# Patient Record
Sex: FEMALE | Race: ASIAN | Employment: PART TIME | URBAN - METROPOLITAN AREA
[De-identification: names, ages, dates, MRNs, and addresses within clinical notes are randomized per-mention and may not be internally consistent; named-entity substitution may affect disease eponyms.]

---

## 2017-06-28 ENCOUNTER — HOSPITAL ENCOUNTER (OUTPATIENT)
Facility: CLINIC | Age: 67
Setting detail: SPECIMEN
Discharge: HOME | End: 2017-06-28
Admitting: INTERNAL MEDICINE
Payer: MEDICARE

## 2017-06-28 ENCOUNTER — TELEPHONE (OUTPATIENT)
Dept: GASTROENTEROLOGY | Facility: CLINIC | Age: 67
End: 2017-06-28

## 2017-06-28 ENCOUNTER — CARE COORDINATION (OUTPATIENT)
Dept: CARDIOLOGY | Facility: CLINIC | Age: 67
End: 2017-06-28

## 2017-06-28 ENCOUNTER — OFFICE VISIT (OUTPATIENT)
Dept: FAMILY MEDICINE | Facility: CLINIC | Age: 67
End: 2017-06-28

## 2017-06-28 VITALS
DIASTOLIC BLOOD PRESSURE: 83 MMHG | BODY MASS INDEX: 23.95 KG/M2 | HEART RATE: 71 BPM | WEIGHT: 132 LBS | SYSTOLIC BLOOD PRESSURE: 135 MMHG

## 2017-06-28 DIAGNOSIS — E78.5 HYPERLIPIDEMIA LDL GOAL <100: Primary | ICD-10-CM

## 2017-06-28 DIAGNOSIS — M85.80 OSTEOPENIA, UNSPECIFIED LOCATION: ICD-10-CM

## 2017-06-28 DIAGNOSIS — R21 RASH AND NONSPECIFIC SKIN ERUPTION: ICD-10-CM

## 2017-06-28 DIAGNOSIS — E78.5 HYPERLIPIDEMIA LDL GOAL <100: ICD-10-CM

## 2017-06-28 DIAGNOSIS — D36.9 TUBULAR ADENOMA: Primary | ICD-10-CM

## 2017-06-28 DIAGNOSIS — Z80.0 FAMILY HISTORY OF COLON CANCER: ICD-10-CM

## 2017-06-28 DIAGNOSIS — R06.09 DOE (DYSPNEA ON EXERTION): ICD-10-CM

## 2017-06-28 DIAGNOSIS — N63.20 LEFT BREAST LUMP: ICD-10-CM

## 2017-06-28 DIAGNOSIS — Z83.49 FAMILY HISTORY OF THYROID PROBLEM: ICD-10-CM

## 2017-06-28 DIAGNOSIS — I25.10 CAD (CORONARY ARTERY DISEASE): ICD-10-CM

## 2017-06-28 LAB
ALBUMIN SERPL-MCNC: 4.1 G/DL (ref 3.4–5)
ALP SERPL-CCNC: 82 U/L (ref 40–150)
ALT SERPL W P-5'-P-CCNC: 27 U/L (ref 0–50)
ANION GAP SERPL CALCULATED.3IONS-SCNC: 7 MMOL/L (ref 3–14)
AST SERPL W P-5'-P-CCNC: 20 U/L (ref 0–45)
BILIRUB SERPL-MCNC: 0.8 MG/DL (ref 0.2–1.3)
BUN SERPL-MCNC: 16 MG/DL (ref 7–30)
CALCIUM SERPL-MCNC: 9.5 MG/DL (ref 8.5–10.1)
CHLORIDE SERPL-SCNC: 105 MMOL/L (ref 94–109)
CHOLEST SERPL-MCNC: 204 MG/DL
CK SERPL-CCNC: 304 U/L (ref 30–225)
CO2 SERPL-SCNC: 28 MMOL/L (ref 20–32)
CREAT SERPL-MCNC: 0.65 MG/DL (ref 0.52–1.04)
ERYTHROCYTE [DISTWIDTH] IN BLOOD BY AUTOMATED COUNT: 12.7 % (ref 10–15)
GFR SERPL CREATININE-BSD FRML MDRD: NORMAL ML/MIN/1.7M2
GLUCOSE SERPL-MCNC: 89 MG/DL (ref 70–99)
HCT VFR BLD AUTO: 44.2 % (ref 35–47)
HDLC SERPL-MCNC: 66 MG/DL
HGB BLD-MCNC: 13.9 G/DL (ref 11.7–15.7)
LDLC SERPL CALC-MCNC: 121 MG/DL
MCH RBC QN AUTO: 29.5 PG (ref 26.5–33)
MCHC RBC AUTO-ENTMCNC: 31.4 G/DL (ref 31.5–36.5)
MCV RBC AUTO: 94 FL (ref 78–100)
NONHDLC SERPL-MCNC: 139 MG/DL
PLATELET # BLD AUTO: 205 10E9/L (ref 150–450)
POTASSIUM SERPL-SCNC: 4.3 MMOL/L (ref 3.4–5.3)
PROT SERPL-MCNC: 8.2 G/DL (ref 6.8–8.8)
RBC # BLD AUTO: 4.71 10E12/L (ref 3.8–5.2)
SODIUM SERPL-SCNC: 141 MMOL/L (ref 133–144)
TRIGL SERPL-MCNC: 88 MG/DL
TSH SERPL DL<=0.005 MIU/L-ACNC: 1.24 MU/L (ref 0.4–4)
WBC # BLD AUTO: 3.9 10E9/L (ref 4–11)

## 2017-06-28 PROCEDURE — 80053 COMPREHEN METABOLIC PANEL: CPT | Performed by: INTERNAL MEDICINE

## 2017-06-28 PROCEDURE — 36415 COLL VENOUS BLD VENIPUNCTURE: CPT | Performed by: INTERNAL MEDICINE

## 2017-06-28 PROCEDURE — 85027 COMPLETE CBC AUTOMATED: CPT | Performed by: INTERNAL MEDICINE

## 2017-06-28 PROCEDURE — 82550 ASSAY OF CK (CPK): CPT | Performed by: INTERNAL MEDICINE

## 2017-06-28 PROCEDURE — 80061 LIPID PANEL: CPT | Performed by: INTERNAL MEDICINE

## 2017-06-28 ASSESSMENT — PAIN SCALES - GENERAL: PAINLEVEL: MILD PAIN (3)

## 2017-06-28 NOTE — PATIENT INSTRUCTIONS
Primary Care Center Medication Refill Request Information:  * Please contact your pharmacy regarding ANY request for medication refills.  ** [facility] Prescription Fax = [phone]  * Please allow 3 business days for routine medication refills.  * Please allow 5 business days for controlled substance medication refills.     Primary Care Center Test Result notification information:  *You will be notified within 7-10 days of your appointment day regarding the results of your test.  If you are on MyChart you will be notified as soon as the provider has reviewed the results and signed off on them.    Mammogram Screening Tool    Mammogram   Does patient have a history of breast cancer? no  Does patient have breast implants? no  Reason for mammogram? Routine

## 2017-06-28 NOTE — NURSING NOTE
Chief Complaint   Patient presents with     Recheck Medication     Pt is here to follow up on her medications.      Isabella Huston LPN June 28, 2017 7:47 AM

## 2017-06-28 NOTE — MR AVS SNAPSHOT
After Visit Summary   6/28/2017    Anibal Storey    MRN: [id number]           Patient Information     Date Of Birth          1950        Visit Information        Provider Department      6/28/2017 8:35 AM Roque Almaguer MD [facility]        Today's Diagnoses     Tubular adenoma    -  1    Family history of colon cancer        Left breast lump        SAUCEDO (dyspnea on exertion)        Rash and nonspecific skin eruption        Osteopenia, unspecified location        Family history of thyroid problem          Care Instructions    Primary Care Center Medication Refill Request Information:  * Please contact your pharmacy regarding ANY request for medication refills.  ** [facility] Prescription Fax = [phone]  * Please allow 3 business days for routine medication refills.  * Please allow 5 business days for controlled substance medication refills.     Primary Care Center Test Result notification information:  *You will be notified within 7-10 days of your appointment day regarding the results of your test.  If you are on MyChart you will be notified as soon as the provider has reviewed the results and signed off on them.    Mammogram Screening Tool    Mammogram   Does patient have a history of breast cancer? no  Does patient have breast implants? no  Reason for mammogram? Routine              Follow-ups after your visit        Additional Services     ALLERGY/ASTHMA ADULT REFERRAL       Your provider has referred you to: [facility]: Allergy and Asthma Center [facility] [phone]   [email or web address]    Please be aware that coverage of these services is subject to the terms and limitations of your health insurance plan.  Call member services at your health plan with any benefit or coverage questions.      Please bring the following with you to your appointment:    (1) Any X-Rays, CTs or MRIs which have been performed.  Contact the facility where they were done to arrange  for  prior to your scheduled appointment.    (2) List of current medications  (3) This referral request   (4) Any documents/labs given to you for this referral            CANCER RISK MGMT/CANCER GENETIC COUNSELING REFERRAL       Your provider has referred you to the Cancer Risk Management Program - Cancer Genetic Counseling.    Reason for Referral: FH colon ca    We have a sent a notice to a staff member of the Cancer Risk Management Program to give you a call to assist with scheduling your appointment.  You may also call  [phone] ([phone]) to initiate scheduling.    Please be aware that coverage of these services is subject to the terms and limitations of your health insurance plan.  Call member services at your health plan with any benefit or coverage questions.      Please bring the completed family history sheet to your appointment in addition to any available outside medical records documenting your cancer diagnosis.            ENDOCRINOLOGY ADULT REFERRAL       Your provider has referred you to: [facility]: Endocrinology and Diabetes Clinic [facility] [phone]   [email or web address]      Please be aware that coverage of these services is subject to the terms and limitations of your health insurance plan.  Call member services at your health plan with any benefit or coverage questions.      Please bring the following to your appointment:    >>   Any x-rays, CTs or MRIs which have been performed.  Contact the facility where they were done to arrange for  prior to your scheduled appointment.    >>   List of current medications   >>   This referral request   >>   Any documents/labs given to you for this referral            GI Procedure Referral       Last Lab Result: Creatinine (mg/dL)       Date                     Value                 09/23/2016               0.67             ----------  Body mass index is 23.95  kg/(m^2).     Needed:  No  Language:  English    Patient will be contacted to schedule procedure.     Please be aware that coverage of these services is subject to the terms and limitations of your health insurance plan.  Call member services at your health plan with any benefit or coverage questions.  Any procedures must be performed at a [place] facility OR coordinated by your clinic's referral office.    Please bring the following with you to your appointment:    (1) Any X-Rays, CTs or MRIs which have been performed.  Contact the facility where they were done to arrange for  prior to your scheduled appointment.    (2) List of current medications   (3) This referral request   (4) Any documents/labs given to you for this referral                  Follow-up notes from your care team     Return in about 1 year (around 6/28/2018).      Your next 10 appointments already scheduled     Jun 28, 2017 10:00 AM CDT   LAB with  LAB   [facility] ([facility])    [address] [place] [address] [phone]           Patient must bring picture ID.  Patient should be prepared to give a urine specimen  Please do not eat 10-12 hours before your appointment if you are coming in fasting for labs on lipids, cholesterol, or glucose (sugar).  Pregnant women should follow their Care Team instructions. Water with medications is okay. Do not drink coffee or other fluids.   If you have concerns about taking  your medications, please ask at office or if scheduling via MtoVhart, send a message by clicking on Secure Messaging, Message Your Care Team.            Jun 29, 2017  8:30 AM CDT   Lab with  LAB    [facility] ([facility])    [address] [place] [address] [phone]            Jun 29, 2017  9:30 AM CDT   (Arrive by 9:15 AM)   Return Visit with Frank [place] MD   [facility] ([facility] [place])    [address] [place] [address]   [phone]            Jul 07, 2017  2:20 PM CDT   (Arrive by 2:05 PM)   RETURN ENDOCRINE with Bridget Cheng MD   [facility] ([facility])    [address] [place] [address]   [phone]            Jul 12, 2017  1:00 PM CDT   (Arrive by 12:45 PM)   MA DIAGNOSTIC BILATERAL W/ MERLINE with [email or web address]   [facility] ([facility] [place])    [address] [place] [address]   [phone]           Do not use any powder, lotion or deodorant under your arms or on your breast. If you do, we will ask you to remove it before your exam.  Wear comfortable, two-piece clothing.  If you have any allergies, tell your care team.  Bring any previous mammograms from other facilities or have them mailed to the breast center.            Jul 12, 2017  1:30 PM CDT   US BREAST LEFT LIMITED 1-3 QUAD with [email or web address]   [facility] ([facility])    [address] [place] [address] [phone]           Please bring a list of your medicines (including vitamins, minerals and over-the-counter drugs). Also, tell your doctor about any allergies you may have. Wear comfortable clothes and leave your valuables at home.  You do not need to do anything special to prepare for your exam.  Please call the Imaging Department at your exam site with any questions.            Aug 10, 2017  9:15 AM CDT   RETURN GENERAL with Morgan Munguia MD   [facility] ([facility])    [address]  [address]  [facility] [address]   [phone]              Future tests that were ordered for you today     Open Future Orders        Priority Expected Expires Ordered    US Breast Left Limited 1-3 Quadrants Routine  6/28/2018 6/28/2017    [place]  Diagnostic Bilateral w/Jose Routine  6/28/2018 6/28/2017    US Breast Left Complete 4 Quadrants Routine  6/28/2018 6/28/2017    TSH with free T4 reflex Routine 6/28/2017 7/12/2017 6/28/2017            Who to contact     Please call your clinic at [phone] to:    Ask questions about your health    Make or cancel appointments    Discuss your medicines    Learn about your test results    Speak to your doctor   If you have compliments or concerns about an experience at your clinic, or if you wish to file a complaint, please contact [facility] Patient Relations at [phone] or email us at [email or web address]         Additional Information About Your Visit        Debt Resolve Information     Debt Resolve gives you secure access to your electronic health record. If you see a primary care provider, you can also send messages to your care team and make appointments. If you have questions, please call your primary care clinic.  If you do not have a primary care provider, please call [phone] and they will assist you.      [employer]olve is an electronic gateway that provides easy, online access to your medical records. With [employer], you can request a clinic appointment, read your test results, renew a prescription or communicate with your care team.     To access your existing account, please contact your [facility] or call [phone] for assistance.        Care EveryWhere ID     This is your Care EveryWhere ID. This could be used by other organizations to access your [facility] medical records  [id number]        Your Vitals Were     Pulse BMI (Body Mass Index)                71 23.95 kg/m2           Blood Pressure from Last 3 Encounters:   06/28/17 135/83   09/23/16 148/90   06/17/16 138/85    Weight from Last 3 Encounters:   06/28/17 59.9 kg (132 lb)   09/23/16 60.8 kg (134 lb)   06/17/16 62.7 kg (138 lb 3.2 oz)              We Performed the Following      ALLERGY/ASTHMA ADULT REFERRAL     CANCER RISK MGMT/CANCER GENETIC COUNSELING REFERRAL     ENDOCRINOLOGY ADULT REFERRAL     GI Procedure Referral        Primary Care Provider Office Phone # Fax #    Roque Almaguer [phone]       PRIMARY CARE CENTER [address] [place] [id number]        Equal Access to Services     COURTNEY FRANCIS : Hadii aad ku hadasho Soomaali, waaxda luqadaha, qaybta kaalmada adeegyada, waxay idiin hayaan ademanuel khleeleemarimar lasachin macdonald. So [facility] [phone].    ATENCIÓN: Si habla español, tiene a mata disposición servicios gratuitos de asistencia lingüística. Airam al [phone].    We comply with applicable federal civil rights laws and [place] laws. We do not discriminate on the basis of race, color, national origin, age, disability sex, sexual orientation or gender identity.            Thank you!     Thank you for choosing [facility]  for your care. Our goal is always to provide you with excellent care. Hearing back from our patients is one way we can continue to improve our services. Please take a few minutes to complete the written survey that you may receive in the mail after your visit with us. Thank you!             Your Updated Medication List - Protect others around you: Learn how to safely use, store and throw away your medicines at www.disposemymeds.org.          This list is accurate as of: 6/28/17  9:48 AM.  Always use your most recent med list.                   Brand Name Dispense Instructions for use Diagnosis    ALEVE PO      Take 220 mg by mouth daily as needed for moderate pain        aspirin 81 MG EC tablet     90 tablet    Take 1 tablet (81 mg) by mouth daily    Chest pain at rest       calcium carbonate 500 MG chewable tablet    TUMS     Take 1 chew tab by mouth as needed.        clobetasol 0.05 % ointment    TEMOVATE    15 g    Use on itchy areas on hands and feet for up to 2 weeks    Dyshidrotic eczema       metroNIDAZOLE 0.75  % cream    METROCREAM    45 g    Apply topically 2 times daily To affected areas on the face    Rosacea       NATURAL BALANCE OP      Apply to eye daily Preservative free both eyes.        nitroGLYcerin 0.4 MG sublingual tablet    NITROSTAT    30 tablet    Place 1 tablet (0.4 mg) under the tongue every 5 minutes as needed for chest pain    Chest pain at rest       rosuvastatin 5 MG tablet    CRESTOR    90 tablet    Take 1 tablet (5 mg) by mouth daily    Hyperlipidemia LDL goal <100       TYLENOL PO      Take 500 mg by mouth 2 times daily as needed.        VITAMIN C PO      Take 500 mg by mouth as needed. Pt states she takes this when she gets a cold        VITAMIN D (CHOLECALCIFEROL) PO      Take 1,000 Units by mouth daily Every other day

## 2017-06-28 NOTE — PROGRESS NOTES
SUBJECTIVE:    Pt is a 66 year old female with pmh of     Patient Active Problem List   Diagnosis     CAD (coronary artery disease)     RA (rheumatoid arthritis) (H)     Hyperlipidemia LDL goal <100     Osteopenia     Palpitations     Post-menopausal       who is here for evaluation of had concerns including Recheck Medication.    Here for a f/u. Spent long winter in [facility].  1--possible alleriges or asthma (no h/o this) --rashes, seem to come on more in response to certain triggers, she wonders about mites. Also notes in certain environments, has SAUCEDO. No h/o asthma, but does have a recent normal cardiac consult and stress test elsewhere (in [place]) she states, and she was told by MD elsewhere that colder weather is a trigger, and she finds that to be true.   2--CAD: no angina. Sees heart MD after me today. Can tolerate name brand Crestor, not generic.   3--strong FH hypothyroid, she has no classic thyroid sx on ROS  4--FH cancer: dad/sister colon, sister breast, sister uterine, sister bladder. Due for routine colonoscopy for FH colon ca plus her adenoma, also we discuss and she'll go to genetics.  5--due for mammogram, she can still feel a lump that two years ago resulted in u/s and diagnositc mammogram, c/w benign, she'd like to redo, I told her from finding two years ago she'll likely always feel that.  6-occasional hypoglycemic attacks, controls w/ whole grain not refined carbos, and protein  7--I wrote down for her in October to get flu shot and Pvx 23.  8--osteopenia: discussed could do dexa later this summer. She's very hesitant to take meds. She will go to endo to discuss long term bone health mgmt  9--RA, provider is [facility] (per pt they don't offer allergy services)  10--varicose veins gradually worse both legs, some dependent edema chronic and mild.     Allergies   Allergen Reactions     Diltiazem      Serious heart attack symptoms     Ranexa [Ranolazine]      Serious heart attack symptoms       Atenolol Difficulty breathing     Atorvastatin Swelling     Joint swelling     Bacitracin      Red eye     Colchicine Swelling     Cortizone For Kids [Hydrocortisone] Fatigue     Headache also     Lisinopril Swelling and Difficulty breathing     Biaxin [Clarithromycin-Fd&C Yellow #10] Rash     Polymyxin B Rash     Trimox Other (See Comments) and Rash           Current Outpatient Prescriptions   Medication Sig Dispense Refill     Artificial Tear Solution (NATURAL BALANCE OP) Apply to eye daily Preservative free both eyes.       clobetasol (TEMOVATE) 0.05 % ointment Use on itchy areas on hands and feet for up to 2 weeks 15 g 1     metroNIDAZOLE (METROCREAM) 0.75 % cream Apply topically 2 times daily To affected areas on the face 45 g 3     rosuvastatin (CRESTOR) 5 MG tablet Take 1 tablet (5 mg) by mouth daily 90 tablet 3     VITAMIN D, CHOLECALCIFEROL, PO Take 1,000 Units by mouth daily Every other day       nitroglycerin (NITROSTAT) 0.4 MG SL tablet Place 1 tablet (0.4 mg) under the tongue every 5 minutes as needed for chest pain 30 tablet 11     aspirin 81 MG EC tablet Take 1 tablet (81 mg) by mouth daily 90 tablet 3     Naproxen Sodium (ALEVE PO) Take 220 mg by mouth daily as needed for moderate pain       Acetaminophen (TYLENOL PO) Take 500 mg by mouth 2 times daily as needed.        Ascorbic Acid (VITAMIN C PO) Take 500 mg by mouth as needed. Pt states she takes this when she gets a cold        calcium carbonate (TUMS) 500 MG chewable tablet Take 1 chew tab by mouth as needed.       Allergies   Allergen Reactions     Diltiazem      Serious heart attack symptoms     Ranexa [Ranolazine]      Serious heart attack symptoms      Atenolol Difficulty breathing     Atorvastatin Swelling     Joint swelling     Bacitracin      Red eye     Colchicine Swelling     Cortizone For Kids [Hydrocortisone] Fatigue     Headache also     Lisinopril Swelling and Difficulty breathing     Biaxin [Clarithromycin-Fd&C Yellow #10] Rash      Polymyxin B Rash     Trimox Other (See Comments) and Rash     Past Medical History:   Diagnosis Date     Arthritis     Palindromic Rheumatism     Breast tumor      CAD (coronary artery disease)     stenting of the LAD and LCx on 11/30/2012      Cardiac arrhythmia      Cataract, bilateral      Dry eyes     severe     IBS (irritable bowel syndrome)      Optic disc anomalies     tilted     Ptosis      RA (rheumatoid arthritis) (H)      Syncope and collapse      Past Surgical History:   Procedure Laterality Date     APPENDECTOMY  1966     BLEPHAROPLASTY Bilateral 4/2016    doen in [place]     cardiac stents  2012     CHOLECYSTECTOMY  1991     COLONOSCOPY  6/2/2014    Procedure: COMBINED COLONOSCOPY, SINGLE BIOPSY/POLYPECTOMY BY BIOPSY;  Surgeon: Duncan Yanez MD;  Location:  GI     EXCISE BREAST CYST/FIBROADENOMA/TUMOR/DUCT LESION/NIPPLE LESION/AREOLAR LESION  1984 or '85     EYE SURGERY  02/27/2008    bilateral upper bleph repair     PUNCTAL CLOSURE, PLUGS  about 2007    Dr. Santos / javy SAEED         Social History   Substance Use Topics     Smoking status: Never Smoker     Smokeless tobacco: Never Used     Alcohol use Yes      Comment: social             OBJECTIVE:  /83  Pulse 71  Wt 59.9 kg (132 lb)  BMI 23.95 kg/m2  GENERAL APPEARANCE: Alert, no acute distress  HENT: Ears and TMs normal, oral mucosa and posterior oropharynx normal  NECK: No adenopathy,masses or thyromegaly  RESP: lungs clear to auscultation   CV: normal rate, regular rhythm, no murmur or gallop  ABDOMEN: soft, no organomegaly, masses or tenderness  MS: extremities normal, no peripheral edema  SKIN: scattered red flat patches, legs mild varicose veins  NEURO: Alert, oriented, speech and mentation normal  PSYCHE: mentation appears normal, affect and mood normal    ASSESSMENT/PLAN:    Rash: offered referral back to derm, she's been there, for now she'd like to see if allergy identifiable  SAUCEDO: see above, worse w/ certain  weather, and per pt neg cardiology eval in [place] a few mo ago    Will ask allergist to see her for both, possible asthma, and to her the rash seems worse in certain environments as well    CAD: per cardiology, sees today w/ labs    HCM: colonoscopy, mammogram both this summer. Offered Dexa, she'd like to see endocrine to discuss both this and FH thyroid dz (hypothyroid, one w/ cancer).    Genetics clinic for family cancer    Mammogram w/ u/s for persisitent self identified lump which worries her despite benign characertistics on 2015 eval    Varicosities, she could wear TEDS she knows    This fall pvx 23 she knows, has in writing    RA: at this point she just sees immunologist if flares, manages w/ otc nsaids on her own                DAWSON WORLEY MD

## 2017-06-29 ENCOUNTER — OFFICE VISIT (OUTPATIENT)
Dept: CARDIOLOGY | Facility: CLINIC | Age: 67
End: 2017-06-29
Attending: INTERNAL MEDICINE
Payer: COMMERCIAL

## 2017-06-29 ENCOUNTER — PRE VISIT (OUTPATIENT)
Dept: CARDIOLOGY | Facility: CLINIC | Age: 67
End: 2017-06-29

## 2017-06-29 VITALS
OXYGEN SATURATION: 97 % | WEIGHT: 133.2 LBS | HEIGHT: 63 IN | DIASTOLIC BLOOD PRESSURE: 81 MMHG | HEART RATE: 71 BPM | SYSTOLIC BLOOD PRESSURE: 123 MMHG | BODY MASS INDEX: 23.6 KG/M2

## 2017-06-29 DIAGNOSIS — M06.9 RHEUMATOID ARTHRITIS OF HAND, UNSPECIFIED LATERALITY, UNSPECIFIED RHEUMATOID FACTOR PRESENCE: ICD-10-CM

## 2017-06-29 DIAGNOSIS — I25.10 CORONARY ARTERY DISEASE INVOLVING NATIVE CORONARY ARTERY OF NATIVE HEART WITHOUT ANGINA PECTORIS: Primary | ICD-10-CM

## 2017-06-29 DIAGNOSIS — R07.9 CHEST PAIN AT REST: ICD-10-CM

## 2017-06-29 DIAGNOSIS — E78.5 HYPERLIPIDEMIA LDL GOAL <100: ICD-10-CM

## 2017-06-29 PROCEDURE — 99214 OFFICE O/P EST MOD 30 MIN: CPT | Mod: ZP | Performed by: INTERNAL MEDICINE

## 2017-06-29 PROCEDURE — 99212 OFFICE O/P EST SF 10 MIN: CPT | Mod: ZF

## 2017-06-29 RX ORDER — ROSUVASTATIN CALCIUM 5 MG
5 TABLET ORAL DAILY
Qty: 90 TABLET | Refills: 3 | Status: SHIPPED | OUTPATIENT
Start: 2017-06-29

## 2017-06-29 RX ORDER — NITROGLYCERIN 0.4 MG/1
0.4 TABLET SUBLINGUAL EVERY 5 MIN PRN
Qty: 30 TABLET | Refills: 11 | Status: SHIPPED | OUTPATIENT
Start: 2017-06-29

## 2017-06-29 ASSESSMENT — PAIN SCALES - GENERAL: PAINLEVEL: NO PAIN (0)

## 2017-06-29 NOTE — PROGRESS NOTES
HPI: Anibal Storey is a 66 year old female former patient of Dr. Martínez who presents for follow up regarding history of RA, CAD s/p DENNY to LAD and circ in , as well as HLD.     Anibal describes that her health has been stable since she was seen 1 year ago with less palpitation. She does not that while in [place] recently, she had SAUCEDO and thus sought medical attention which culminated in a lexiscan stress that was negative for ischemic disease; she estimates her dyspnea was related to the cold air. She notes that her RA is atypical, continuing that for her RA she follows with her PCP Dr. Almaguer who has offered MTX in the past however she declined this as she has an extensive family history of cancer (uterine, colon, stomach). She also notes that for 1 month she has discontinued taking Crestor 5 mg qd as it was no longer covered and was expensive. Despite d/c this medication she is noted to have a CK of 304 this visit.     Otherwise, she denies chest pain, light headedness, or syncope. She denies significant palpitation. She denies orthopnea, PND, or edema. Anibal endorses easy bruising which is not a new issue for her. She notes that she recently had extensive workup that included liver enzyme analysis that returned that she was poor at metabolizing CY. She describes myalgia which she states is typical for her, migratory, unchanged from discontinuing crestor 5 mg, and today in her shoulders. BP readings at home have fluctuated but are typically 110-120 mmHg systolic with occasional reading 130-150 mmHg.     PAST MEDICAL HISTORY:  Past Medical History:   Diagnosis Date     Arthritis     Palindromic Rheumatism     Breast tumor      CAD (coronary artery disease)     stenting of the LAD and LCx on 2012      Cardiac arrhythmia      Cataract, bilateral      Dry eyes     severe     IBS (irritable bowel syndrome)      Optic disc anomalies     tilted     Ptosis      RA (rheumatoid arthritis) (H)      Syncope  and collapse        CURRENT MEDICATIONS:  Current Outpatient Prescriptions   Medication Sig Dispense Refill     Artificial Tear Solution (NATURAL BALANCE OP) Apply to eye daily Preservative free both eyes.       clobetasol (TEMOVATE) 0.05 % ointment Use on itchy areas on hands and feet for up to 2 weeks 15 g 1     metroNIDAZOLE (METROCREAM) 0.75 % cream Apply topically 2 times daily To affected areas on the face 45 g 3     rosuvastatin (CRESTOR) 5 MG tablet Take 1 tablet (5 mg) by mouth daily 90 tablet 3     VITAMIN D, CHOLECALCIFEROL, PO Take 1,000 Units by mouth daily Every other day       nitroglycerin (NITROSTAT) 0.4 MG SL tablet Place 1 tablet (0.4 mg) under the tongue every 5 minutes as needed for chest pain 30 tablet 11     aspirin 81 MG EC tablet Take 1 tablet (81 mg) by mouth daily 90 tablet 3     Naproxen Sodium (ALEVE PO) Take 220 mg by mouth daily as needed for moderate pain       Acetaminophen (TYLENOL PO) Take 500 mg by mouth 2 times daily as needed.        Ascorbic Acid (VITAMIN C PO) Take 500 mg by mouth as needed. Pt states she takes this when she gets a cold        calcium carbonate (TUMS) 500 MG chewable tablet Take 1 chew tab by mouth as needed.         PAST SURGICAL HISTORY:  Past Surgical History:   Procedure Laterality Date     APPENDECTOMY  1966     BLEPHAROPLASTY Bilateral 4/2016    doen in [place]     cardiac stents  2012     CHOLECYSTECTOMY  1991     COLONOSCOPY  6/2/2014    Procedure: COMBINED COLONOSCOPY, SINGLE BIOPSY/POLYPECTOMY BY BIOPSY;  Surgeon: Duncan Yanez MD;  Location: [facility]     EXCISE BREAST CYST/FIBROADENOMA/TUMOR/DUCT LESION/NIPPLE LESION/AREOLAR LESION  1984 or '85     EYE SURGERY  02/27/2008    bilateral upper bleph repair     PUNCTAL CLOSURE, PLUGS  about 2007    Dr. Santos / javy in [place]       ALLERGIES     Allergies   Allergen Reactions     Diltiazem      Serious heart attack symptoms     Ranexa [Ranolazine]      Serious heart attack symptoms      Atenolol  Difficulty breathing     Atorvastatin Swelling     Joint swelling     Bacitracin      Red eye     Colchicine Swelling     Cortizone For Kids [Hydrocortisone] Fatigue     Headache also     Lisinopril Swelling and Difficulty breathing     Biaxin [Clarithromycin-Fd&C Yellow #10] Rash     Polymyxin B Rash     Trimox Other (See Comments) and Rash       FAMILY HISTORY:  Family History   Problem Relation Age of Onset     DIABETES Father      Hypertension Father      Cancer - colorectal Father      Glaucoma Sister      sister #1     Macular Degeneration Mother      blindness     OSTEOPOROSIS Mother      hip fracture x 2     Retinal detachment Sister      sister #2     Eye Disorder Sister       'lazy eye'     Cancer - colorectal Sister      and uterine cancer/radiation; bladder cancer     Breast Cancer Sister      in her 30's     CANCER       thyroid/niece in her 20's/alive     Melanoma No family hx of      Skin Cancer No family hx of        SOCIAL HISTORY:  Social History     Social History     Marital status:      Spouse name: N/A     Number of children: N/A     Years of education: N/A     Occupational History      Retired     Social History Main Topics     Smoking status: Never Smoker     Smokeless tobacco: Never Used     Alcohol use Yes      Comment: social     Drug use: No     Sexual activity: Yes     Partners: Male     Other Topics Concern     None     Social History Narrative    How much exercise per week? 4-5 x weekly    How much calcium per day? No       How much caffeine per day? Yes.  1-2 cups coffee daily    How much vitamin D per day? No    Do you/your family wear seatbelts?  Yes    Do you/your family use safety helmets? No    Do you/your family use sunscreen? Yes    Do you/your family keep firearms in the home? Yes    Do you/your family have a smoke detector(s)? Yes        Do you feel safe in your home? Yes    Has anyone ever touched you in an unwanted manner? No                   ROS:   Constitutional:  "No fever, chills, or sweats. No weight gain/loss   ENT: No visual disturbance, ear ache, epistaxis, sore throat  Allergies/Immunologic: Negative.   Respiratory: No cough, hemoptysia  Cardiovascular: As per HPI  GI: No nausea, vomiting, hematemesis, melena, or hematochezia  : No urinary frequency, dysuria, or hematuria  Integument: Negative  Psychiatric: Negative  Neuro: Negative  Endocrinology: Negative   Musculoskeletal: Negative    EXAM:  /81 (BP Location: Left arm, Patient Position: Chair, Cuff Size: Adult Regular)  Pulse 71  Ht 1.6 m (5' 3\")  Wt 60.4 kg (133 lb 3.2 oz)  SpO2 97%  BMI 23.6 kg/m2  In general, the patient is a pleasant female in no apparent distress.    HEENT: NC/AT.  PERRLA.  EOMI.  Sclerae white, not injected.  Nares clear.  Pharynx without erythema or exudate.  Dentition intact.    Neck: No adenopathy.  No thyromegaly. Carotids +4/4 bilaterally without bruits.  No jugular venous distension.   Heart: RRR. Normal S1, S2 splits physiologically. No murmur, rub, click, or gallop. The PMI is in the 5th ICS in the midclavicular line. There is no heave.    Lungs: CTA.  No ronchi, wheezes, rales.  No dullness to percussion.   Abdomen: Soft, nontender, nondistended. No organomegaly.  No bruits.   Extremities: No clubbing, cyanosis, or edema.  The pulses are +4/4 at the radial, brachial, femoral, popliteal, DP, and PT sites bilaterally.  No bruits are noted.  Neurologic: Alert and oriented to person/place/time, normal speech, gait and affect  Skin: No petechiae, purpura. Multiple hives to caudal aspect of feet bilateral with well healed biopsy scar    Labs:  LIPID RESULTS:  Lab Results   Component Value Date    CHOL 204 (H) 06/28/2017    HDL 66 06/28/2017     (H) 06/28/2017    TRIG 88 06/28/2017    CHOLHDLRATIO 2.9 05/19/2015    NHDL 139 (H) 06/28/2017       LIVER ENZYME RESULTS:  Lab Results   Component Value Date    AST 20 06/28/2017    ALT 27 06/28/2017       CBC RESULTS:  Lab " Results   Component Value Date    WBC 3.9 (L) 06/28/2017    RBC 4.71 06/28/2017    HGB 13.9 06/28/2017    HCT 44.2 06/28/2017    MCV 94 06/28/2017    MCH 29.5 06/28/2017    MCHC 31.4 (L) 06/28/2017    RDW 12.7 06/28/2017     06/28/2017       BMP RESULTS:  Lab Results   Component Value Date     06/28/2017    POTASSIUM 4.3 06/28/2017    CHLORIDE 105 06/28/2017    CO2 28 06/28/2017    ANIONGAP 7 06/28/2017    GLC 89 06/28/2017    BUN 16 06/28/2017    CR 0.65 06/28/2017    GFRESTIMATED >90  Non  GFR Calc   06/28/2017    GFRESTBLACK >90   GFR Calc   06/28/2017    GLYNN 9.5 06/28/2017          INR RESULTS:  Lab Results   Component Value Date    INR 0.93 11/30/2012    INR 0.90 10/15/2012           Assessment and Plan:   Ms. Storey is a 61 yo female with hypertension, hyperlpidemia and coronary artery disease S/P DENNY placement to the LAD and left circumflex on 11/30/2012.   We discussed the results with patient:  CAD :  Does not have any more chest pain. MRI stress showed inferior ischemia in 2014 but most recent nuclear stress done in [facility] was negative. She also had a cardiac CT at [facility] and that did not show any obstructive disease. (We do not have the results). Question if her history of CAD is related to her pro-inflammatory state and history of RA.   Secondary prevention of CAD:   Hyperlipidemia: We will continue Crestor 5 mg qd. Patient did try atorvastatin in the past with significant adverse drug reaction including joint swelling and rash. She has tried pravastatin in the past with poor control of her cholesterol.   Hypertension: controlled off medications, continue to monitor  Continue diet and lifestyle modification. Patient describes a diverse and healthy diet low in cholesterol rich foods.    Dell Lee PA-C  [facility] Cardiology  Pager [phone]    I interviewed and examined the patient with the SANDRA.  I agree with the assessment and plan as  documented.  ALEJANDRA Austin MD, PhD  Professor of Medicine  Division of Cardiology      CC  Patient Care Team:  Roque Almaguer MD as PCP - General (Family Practice)  Morgan Munguia MD as MD (Ophthalmology)  Svetlana Mccauley MD as MD (INTERNAL MEDICINE - ENDOCRINOLOGY, DIABETES & METABOLISM)  Roque Almaguer MD as MD (Family Practice)  Nissen, Rick L, MD as MD (Otolaryngology)  Sulema Larsen RN as Nurse Coordinator (Cardiology)  Frank Austin MD as MD (Cardiology)  ROQUE ALMAGUER

## 2017-06-29 NOTE — PATIENT INSTRUCTIONS
Repeat labs in 3 months.   Return to clinic in one year.  Fasting labs will be needed prior to this appointment.     Please do not hesitate to call if you have any cardiology related questions or concerns, or need to schedule an appointment, at [phone].    Cardiology Medication Refill Request Information:  * Please contact your pharmacy regarding ANY request for medication refills.  ** [facility] Prescription Fax = [phone]  * Please allow 3 business days for routine medication refills.    Cardiology Test Result notification information:  *You will be notified with in 7-10 days of your appointment day regarding the results of your test. If you are on MyChart you will be notified as soon as the provider has reviewed the results and signed off on them. Please call RN Care Coordinator with questions regarding results.

## 2017-06-29 NOTE — LETTER
2017      RE: Anibal Storey  [address] [address]       Dear Colleague,    Thank you for the opportunity to participate in the care of your patient, Anibal Storey, at the [facility] at [facility]. Please see a copy of my visit note below.    HPI: Anibal Storey is a 66 year old female former patient of Dr. Martínez who presents for follow up regarding history of RA, CAD s/p DENNY to LAD and circ in , as well as HLD.     Anibal describes that her health has been stable since she was seen 1 year ago with less palpitation. She does not that while in [place] recently, she had SAUCEDO and thus sought medical attention which culminated in a lexiscan stress that was negative for ischemic disease; she estimates her dyspnea was related to the cold air. She notes that her RA is atypical, continuing that for her RA she follows with her PCP Dr. Almaguer who has offered MTX in the past however she declined this as she has an extensive family history of cancer (uterine, colon, stomach). She also notes that for 1 month she has discontinued taking Crestor 5 mg qd as it was no longer covered and was expensive. Despite d/c this medication she is noted to have a CK of 304 this visit.     Otherwise, she denies chest pain, light headedness, or syncope. She denies significant palpitation. She denies orthopnea, PND, or edema. Anibal endorses easy bruising which is not a new issue for her. She notes that she recently had extensive workup that included liver enzyme analysis that returned that she was poor at metabolizing CY. She describes myalgia which she states is typical for her, migratory, unchanged from discontinuing crestor 5 mg, and today in her shoulders. BP readings at home have fluctuated but are typically 110-120 mmHg systolic with occasional reading 130-150 mmHg.     PAST MEDICAL HISTORY:  Past Medical History:   Diagnosis Date     Arthritis     Palindromic  Rheumatism     Breast tumor      CAD (coronary artery disease)     stenting of the LAD and LCx on 11/30/2012      Cardiac arrhythmia      Cataract, bilateral      Dry eyes     severe     IBS (irritable bowel syndrome)      Optic disc anomalies     tilted     Ptosis      RA (rheumatoid arthritis) (H)      Syncope and collapse        CURRENT MEDICATIONS:  Current Outpatient Prescriptions   Medication Sig Dispense Refill     Artificial Tear Solution (NATURAL BALANCE OP) Apply to eye daily Preservative free both eyes.       clobetasol (TEMOVATE) 0.05 % ointment Use on itchy areas on hands and feet for up to 2 weeks 15 g 1     metroNIDAZOLE (METROCREAM) 0.75 % cream Apply topically 2 times daily To affected areas on the face 45 g 3     rosuvastatin (CRESTOR) 5 MG tablet Take 1 tablet (5 mg) by mouth daily 90 tablet 3     VITAMIN D, CHOLECALCIFEROL, PO Take 1,000 Units by mouth daily Every other day       nitroglycerin (NITROSTAT) 0.4 MG SL tablet Place 1 tablet (0.4 mg) under the tongue every 5 minutes as needed for chest pain 30 tablet 11     aspirin 81 MG EC tablet Take 1 tablet (81 mg) by mouth daily 90 tablet 3     Naproxen Sodium (ALEVE PO) Take 220 mg by mouth daily as needed for moderate pain       Acetaminophen (TYLENOL PO) Take 500 mg by mouth 2 times daily as needed.        Ascorbic Acid (VITAMIN C PO) Take 500 mg by mouth as needed. Pt states she takes this when she gets a cold        calcium carbonate (TUMS) 500 MG chewable tablet Take 1 chew tab by mouth as needed.         PAST SURGICAL HISTORY:  Past Surgical History:   Procedure Laterality Date     APPENDECTOMY  1966     BLEPHAROPLASTY Bilateral 4/2016    doen in [place]     cardiac stents  2012     CHOLECYSTECTOMY  1991     COLONOSCOPY  6/2/2014    Procedure: COMBINED COLONOSCOPY, SINGLE BIOPSY/POLYPECTOMY BY BIOPSY;  Surgeon: Duncan Yanez MD;  Location: [facility]     EXCISE BREAST CYST/FIBROADENOMA/TUMOR/DUCT LESION/NIPPLE LESION/AREOLAR LESION   1984 or '85     EYE SURGERY  02/27/2008    bilateral upper bleph repair     PUNCTAL CLOSURE, PLUGS  about 2007    Dr. Santos / javy in [place]       ALLERGIES     Allergies   Allergen Reactions     Diltiazem      Serious heart attack symptoms     Ranexa [Ranolazine]      Serious heart attack symptoms      Atenolol Difficulty breathing     Atorvastatin Swelling     Joint swelling     Bacitracin      Red eye     Colchicine Swelling     Cortizone For Kids [Hydrocortisone] Fatigue     Headache also     Lisinopril Swelling and Difficulty breathing     Biaxin [Clarithromycin-Fd&C Yellow #10] Rash     Polymyxin B Rash     Trimox Other (See Comments) and Rash       FAMILY HISTORY:  Family History   Problem Relation Age of Onset     DIABETES Father      Hypertension Father      Cancer - colorectal Father      Glaucoma Sister      sister #1     Macular Degeneration Mother      blindness     OSTEOPOROSIS Mother      hip fracture x 2     Retinal detachment Sister      sister #2     Eye Disorder Sister       'lazy eye'     Cancer - colorectal Sister      and uterine cancer/radiation; bladder cancer     Breast Cancer Sister      in her 30's     CANCER       thyroid/niece in her 20's/alive     Melanoma No family hx of      Skin Cancer No family hx of        SOCIAL HISTORY:  Social History     Social History     Marital status:      Spouse name: N/A     Number of children: N/A     Years of education: N/A     Occupational History      Retired     Social History Main Topics     Smoking status: Never Smoker     Smokeless tobacco: Never Used     Alcohol use Yes      Comment: social     Drug use: No     Sexual activity: Yes     Partners: Male     Other Topics Concern     None     Social History Narrative    How much exercise per week? 4-5 x weekly    How much calcium per day? No       How much caffeine per day? Yes.  1-2 cups coffee daily    How much vitamin D per day? No    Do you/your family wear seatbelts?  Yes    Do you/your  "family use safety helmets? No    Do you/your family use sunscreen? Yes    Do you/your family keep firearms in the home? Yes    Do you/your family have a smoke detector(s)? Yes        Do you feel safe in your home? Yes    Has anyone ever touched you in an unwanted manner? No                   ROS:   Constitutional: No fever, chills, or sweats. No weight gain/loss   ENT: No visual disturbance, ear ache, epistaxis, sore throat  Allergies/Immunologic: Negative.   Respiratory: No cough, hemoptysia  Cardiovascular: As per HPI  GI: No nausea, vomiting, hematemesis, melena, or hematochezia  : No urinary frequency, dysuria, or hematuria  Integument: Negative  Psychiatric: Negative  Neuro: Negative  Endocrinology: Negative   Musculoskeletal: Negative    EXAM:  /81 (BP Location: Left arm, Patient Position: Chair, Cuff Size: Adult Regular)  Pulse 71  Ht 1.6 m (5' 3\")  Wt 60.4 kg (133 lb 3.2 oz)  SpO2 97%  BMI 23.6 kg/m2  In general, the patient is a pleasant female in no apparent distress.    HEENT: NC/AT.  PERRLA.  EOMI.  Sclerae white, not injected.  Nares clear.  Pharynx without erythema or exudate.  Dentition intact.    Neck: No adenopathy.  No thyromegaly. Carotids +4/4 bilaterally without bruits.  No jugular venous distension.   Heart: RRR. Normal S1, S2 splits physiologically. No murmur, rub, click, or gallop. The PMI is in the 5th ICS in the midclavicular line. There is no heave.    Lungs: CTA.  No ronchi, wheezes, rales.  No dullness to percussion.   Abdomen: Soft, nontender, nondistended. No organomegaly.  No bruits.   Extremities: No clubbing, cyanosis, or edema.  The pulses are +4/4 at the radial, brachial, femoral, popliteal, DP, and PT sites bilaterally.  No bruits are noted.  Neurologic: Alert and oriented to person/place/time, normal speech, gait and affect  Skin: No petechiae, purpura. Multiple hives to caudal aspect of feet bilateral with well healed biopsy scar    Labs:  LIPID RESULTS:  Lab " Results   Component Value Date    CHOL 204 (H) 06/28/2017    HDL 66 06/28/2017     (H) 06/28/2017    TRIG 88 06/28/2017    CHOLHDLRATIO 2.9 05/19/2015    NHDL 139 (H) 06/28/2017       LIVER ENZYME RESULTS:  Lab Results   Component Value Date    AST 20 06/28/2017    ALT 27 06/28/2017       CBC RESULTS:  Lab Results   Component Value Date    WBC 3.9 (L) 06/28/2017    RBC 4.71 06/28/2017    HGB 13.9 06/28/2017    HCT 44.2 06/28/2017    MCV 94 06/28/2017    MCH 29.5 06/28/2017    MCHC 31.4 (L) 06/28/2017    RDW 12.7 06/28/2017     06/28/2017       BMP RESULTS:  Lab Results   Component Value Date     06/28/2017    POTASSIUM 4.3 06/28/2017    CHLORIDE 105 06/28/2017    CO2 28 06/28/2017    ANIONGAP 7 06/28/2017    GLC 89 06/28/2017    BUN 16 06/28/2017    CR 0.65 06/28/2017    GFRESTIMATED >90  Non  GFR Calc   06/28/2017    GFRESTBLACK >90   GFR Calc   06/28/2017    GLYNN 9.5 06/28/2017          INR RESULTS:  Lab Results   Component Value Date    INR 0.93 11/30/2012    INR 0.90 10/15/2012           Assessment and Plan:   Ms. Storey is a 61 yo female with hypertension, hyperlpidemia and coronary artery disease S/P DENNY placement to the LAD and left circumflex on 11/30/2012.   We discussed the results with patient:  CAD :  Does not have any more chest pain. MRI stress showed inferior ischemia in 2014 but [facility]. She also had a cardiac CT [facility] and that did not show any obstructive disease. (We do not have the results). Question if her history of CAD is related to her pro-inflammatory state and history of RA.   Secondary prevention of CAD:   Hyperlipidemia: We will continue Crestor 5 mg qd. Patient did try atorvastatin in the past with significant adverse drug reaction including joint swelling and rash. She has tried pravastatin in the past with poor control of her cholesterol.   Hypertension: controlled  off medications, continue to monitor  Continue diet and lifestyle modification. Patient describes a diverse and healthy diet low in cholesterol rich foods.    Dell Lee PA-C  [facility] Cardiology  Pager [phone]    I interviewed and examined the patient with the SANDRA.  I agree with the assessment and plan as documented.    Frank Austin MD, PhD  Professor of Medicine  Division of Cardiology      CC  Patient Care Team:  Roque Almaguer MD as PCP - General (Family Practice)  Morgan Munguia MD as MD (Ophthalmology)  Svetlana Mccauley MD as MD (INTERNAL MEDICINE - ENDOCRINOLOGY, DIABETES & METABOLISM)  Nissen, Rick L, MD as MD (Otolaryngology)  Sulema Larsen, RN as Nurse Coordinator (Cardiology)  Frank Austin MD as MD (Cardiology)  ROQUE ALMAGUER

## 2017-06-29 NOTE — NURSING NOTE
Chief Complaint   Patient presents with     Follow Up For     1 year follow up for CAD, hyperlipidemia      Vitals were taken and medications were reconciled.     Shea Gimenez MA  8:48 AM

## 2017-06-29 NOTE — MR AVS SNAPSHOT
After Visit Summary   6/29/2017    Anibal Storey    MRN: [id number]           Patient Information     Date Of Birth          1950        Visit Information        Provider Department      6/29/2017 9:30 AM Frank Austin MD [facility]        Today's Diagnoses     Coronary artery disease involving native coronary artery of native heart without angina pectoris    -  1    Hyperlipidemia LDL goal <100        Chest pain at rest        RA (rheumatoid arthritis) (H)          Care Instructions    Repeat labs in 3 months.   Return to clinic in one year.  Fasting labs will be needed prior to this appointment.     Please do not hesitate to call if you have any cardiology related questions or concerns, or need to schedule an appointment, at [phone].    Cardiology Medication Refill Request Information:  * Please contact your pharmacy regarding ANY request for medication refills.  ** [facility] Prescription Fax = [phone]  * Please allow 3 business days for routine medication refills.    Cardiology Test Result notification information:  *You will be notified with in 7-10 days of your appointment day regarding the results of your test. If you are on MyChart you will be notified as soon as the provider has reviewed the results and signed off on them. Please call RN Care Coordinator with questions regarding results.              Follow-ups after your visit        Follow-up notes from your care team     Discussed this visit Return in about 1 year (around 6/29/2018).      Your next 10 appointments already scheduled     Jul 07, 2017  2:20 PM CDT   (Arrive by 2:05 PM)   RETURN ENDOCRINE with Bridget Cheng MD   [facility] ([facility])    [address] [place] [address] [phone]            Jul 12, 2017  1:00 PM CDT   (Arrive by 12:45 PM)   MA DIAGNOSTIC BILATERAL W/ MERLINE with [id number]   [facility] ([facility])    [address] [facility] [address] [phone]           Do not use any powder, lotion or deodorant under your arms or on your breast. If you do, we will ask you to remove it before your exam.  Wear comfortable, two-piece clothing.  If you have any allergies, tell your care team.  Bring any previous mammograms from other facilities or have them mailed to the breast center.            Jul 12, 2017  1:30 PM CDT   US BREAST LEFT LIMITED 1-3 QUAD with [email or web address]   [facility] ([facility])    [address]  [address] [facility] [id number] [phone]           Please bring a list of your medicines (including vitamins, minerals and over-the-counter drugs). Also, tell your doctor about any allergies you may have. Wear comfortable clothes and leave your valuables at home.  You do not need to do anything special to prepare for your exam.  Please call the Imaging Department at your exam site with any questions.            Jul 17, 2017   Procedure with Duncan Crow MD   [facility] [place] Endoscopy ([facility])    [address] [address] [phone]           The [facility] is located on the corner of [address] and [address] on the [facility]. It is easily accessible from virtually any point in the [place] area, via [address] and [address].            Aug 10, 2017  9:15 AM CDT   RETURN GENERAL with Morgan Munguia MD   Eye Clinic ([facility])    [address]  [address]  [address]  [facility] [address]   [phone]              Future tests that were ordered for you today     Open Future Orders        Priority Expected Expires Ordered    CBC with platelets Routine  6/29/2018 6/29/2017    Comprehensive metabolic panel Routine 9/1/2017 6/29/2018 6/29/2017    Lipid panel  "reflex to direct LDL Routine 9/1/2017 6/29/2018 6/29/2017    CK total Routine 9/1/2017 6/29/2018 6/29/2017    US Breast Left Limited 1-3 Quadrants Routine  6/28/2018 6/28/2017    MA Diagnostic Bilateral w/Jose Routine  6/28/2018 6/28/2017            Who to contact     If you have questions or need follow up information about today's clinic visit or your schedule please contact [facility] directly at [phone].  Normal or non-critical lab and imaging results will be communicated to you by [employer], letter or phone within 4 business days after the clinic has received the results. If you do not hear from us within 7 days, please contact the clinic through [employer] or phone. If you have a critical or abnormal lab result, we will notify you by phone as soon as possible.  Submit refill requests through [employer] or call your pharmacy and they will forward the refill request to us. Please allow 3 business days for your refill to be completed.          Additional Information About Your Visit        [employer] Information     [employer] gives you secure access to your electronic health record. If you see a primary care provider, you can also send messages to your care team and make appointments. If you have questions, please call your primary care clinic.  If you do not have a primary care provider, please call [phone] and they will assist you.        Care EveryWhere ID     This is your Care EveryWhere ID. This could be used by other organizations to access your [place] medical records  [id number]        Your Vitals Were     Pulse Height Pulse Oximetry BMI (Body Mass Index)          71 1.6 m (5' 3\") 97% 23.6 kg/m2         Blood Pressure from Last 3 Encounters:   06/29/17 123/81   06/28/17 135/83   09/23/16 148/90    Weight from Last 3 Encounters:   06/29/17 60.4 kg (133 lb 3.2 oz)   06/28/17 59.9 kg (132 lb)   09/23/16 60.8 kg (134 lb)                 Today's Medication Changes          These changes are accurate " as of: 6/29/17 10:28 AM.  If you have any questions, ask your nurse or doctor.               Start taking these medicines.        Dose/Directions    CRESTOR 5 MG tablet   Used for:  Hyperlipidemia LDL goal <100, Chest pain at rest, Coronary artery disease involving native coronary artery of native heart without angina pectoris   Generic drug:  rosuvastatin   Replaces:  rosuvastatin 5 MG tablet   Started by:  Frank Austin MD        Dose:  5 mg   Take 1 tablet (5 mg) by mouth daily   Quantity:  90 tablet   Refills:  3         Stop taking these medicines if you haven't already. Please contact your care team if you have questions.     rosuvastatin 5 MG tablet   Commonly known as:  CRESTOR   Replaced by:  CRESTOR 5 MG tablet   Stopped by:  Frank Austin MD                Where to get your medicines      These medications were sent to [place] [address] [place] [address] [address]     Phone:  [phone]     aspirin 81 MG EC tablet    CRESTOR 5 MG tablet    nitroGLYcerin 0.4 MG sublingual tablet                Primary Care Provider Office Phone # Fax #    Roque Almaguer [phone]       [facility] [address] [place] [address]        Equal Access to Services     COURTNEY FRANCIS AH: Hadii bertrand ku hadasho Soomaali, waaxda luqadaha, qaybta kaalmada adeegyada, waxay feliciain laly macdonald. So [facility] [phone].    ATENCIÓN: Si habla español, tiene a mata disposición servicios gratuitos de asistencia lingüística. Llame al [phone].    We comply with applicable federal civil rights laws and [place] laws. We do not discriminate on the basis of race, color, national origin, age, disability sex, sexual orientation or gender identity.            Thank you!     Thank you for choosing [facility]  for your care. Our goal is always to provide you with excellent care. Hearing back from our patients is one way  we can continue to improve our services. Please take a few minutes to complete the written survey that you may receive in the mail after your visit with us. Thank you!             Your Updated Medication List - Protect others around you: Learn how to safely use, store and throw away your medicines at www.disposemymeds.org.          This list is accurate as of: 6/29/17 10:28 AM.  Always use your most recent med list.                   Brand Name Dispense Instructions for use Diagnosis    ALEVE PO      Take 220 mg by mouth daily as needed for moderate pain        aspirin 81 MG EC tablet     90 tablet    Take 1 tablet (81 mg) by mouth daily    Chest pain at rest, Hyperlipidemia LDL goal <100, Coronary artery disease involving native coronary artery of native heart without angina pectoris       calcium carbonate 500 MG chewable tablet    TUMS     Take 1 chew tab by mouth as needed.        clobetasol 0.05 % ointment    TEMOVATE    15 g    Use on itchy areas on hands and feet for up to 2 weeks    Dyshidrotic eczema       CRESTOR 5 MG tablet   Generic drug:  rosuvastatin     90 tablet    Take 1 tablet (5 mg) by mouth daily    Hyperlipidemia LDL goal <100, Chest pain at rest, Coronary artery disease involving native coronary artery of native heart without angina pectoris       metroNIDAZOLE 0.75 % cream    METROCREAM    45 g    Apply topically 2 times daily To affected areas on the face    Rosacea       NATURAL BALANCE OP      Apply to eye daily Preservative free both eyes.        nitroGLYcerin 0.4 MG sublingual tablet    NITROSTAT    30 tablet    Place 1 tablet (0.4 mg) under the tongue every 5 minutes as needed for chest pain    Chest pain at rest, Hyperlipidemia LDL goal <100, Coronary artery disease involving native coronary artery of native heart without angina pectoris       TYLENOL PO      Take 500 mg by mouth 2 times daily as needed.        VITAMIN C PO      Take 500 mg by mouth as needed. Pt states she takes this  when she gets a cold        VITAMIN D (CHOLECALCIFEROL) PO      Take 1,000 Units by mouth daily Every other day

## 2017-06-29 NOTE — NURSING NOTE
Diet: Patient instructed regarding a heart healthy diet, including discussion of reduced fat and sodium intake. Patient demonstrated understanding of this information and agreed to call with further questions or concerns.  Labs: Patient was given results of the laboratory testing obtained today. Patient was instructed to return for the next laboratory testing in one year. Patient demonstrated understanding of this information and agreed to call with further questions or concerns.   Med Reconcile: Reviewed and verified all current medications with the patient. The updated medication list was printed and given to the patient.  Return Appointment: Patient given instructions regarding scheduling next clinic visit. Patient demonstrated understanding of this information and agreed to call with further questions or concerns.

## 2017-07-07 ENCOUNTER — OFFICE VISIT (OUTPATIENT)
Dept: ENDOCRINOLOGY | Facility: CLINIC | Age: 67
End: 2017-07-07

## 2017-07-07 VITALS
HEIGHT: 63 IN | BODY MASS INDEX: 23.81 KG/M2 | WEIGHT: 134.4 LBS | HEART RATE: 78 BPM | SYSTOLIC BLOOD PRESSURE: 131 MMHG | DIASTOLIC BLOOD PRESSURE: 81 MMHG

## 2017-07-07 DIAGNOSIS — E78.5 HYPERLIPIDEMIA LDL GOAL <100: ICD-10-CM

## 2017-07-07 DIAGNOSIS — Z78.0 POST-MENOPAUSAL: ICD-10-CM

## 2017-07-07 DIAGNOSIS — Z79.899 OTHER LONG TERM (CURRENT) DRUG THERAPY: ICD-10-CM

## 2017-07-07 DIAGNOSIS — I25.10 CORONARY ARTERY DISEASE INVOLVING NATIVE CORONARY ARTERY OF NATIVE HEART WITHOUT ANGINA PECTORIS: ICD-10-CM

## 2017-07-07 DIAGNOSIS — R00.2 PALPITATIONS: ICD-10-CM

## 2017-07-07 DIAGNOSIS — Z13.1 SCREENING FOR DIABETES MELLITUS: ICD-10-CM

## 2017-07-07 DIAGNOSIS — R55 SYNCOPE, UNSPECIFIED SYNCOPE TYPE: ICD-10-CM

## 2017-07-07 DIAGNOSIS — Z78.0 POST-MENOPAUSAL: Primary | ICD-10-CM

## 2017-07-07 DIAGNOSIS — R42 DIZZINESS: ICD-10-CM

## 2017-07-07 LAB
FSH SERPL-ACNC: 73 IU/L
HBA1C MFR BLD: 5.4 % (ref 4.3–6)
PROLACTIN SERPL-MCNC: 5 UG/L (ref 3–27)

## 2017-07-07 ASSESSMENT — PAIN SCALES - GENERAL: PAINLEVEL: NO PAIN (0)

## 2017-07-07 NOTE — NURSING NOTE
"Chief Complaint   Patient presents with     RECHECK     OSTEOPOROSIS AND THYROID F/U        Initial /81 (BP Location: Left arm, Patient Position: Sitting, Cuff Size: Adult Regular)  Pulse 78  Ht 1.6 m (5' 3\")  Wt 61 kg (134 lb 6.4 oz)  BMI 23.81 kg/m2 Estimated body mass index is 23.81 kg/(m^2) as calculated from the following:    Height as of this encounter: 1.6 m (5' 3\").    Weight as of this encounter: 61 kg (134 lb 6.4 oz).  Medication Reconciliation: complete    "

## 2017-07-07 NOTE — LETTER
7/7/2017       RE: Anibal Storey  [address] [place] [address]     Dear Colleague,    Thank you for referring your patient, Anibal Storey, to the [facility] ENDOCRINOLOGY at [facility]. Please see a copy of my visit note below.    Endocrinology Note    Reason for follow up: she wants to check on chemical imbalance    HPI:  Anibal Storey is a 67 y/o female with a hx of CAD s/p stent in 2012, rheumatoid arthritis, low bone density, irritable bowel syndrome, hyperlipidemia who is here for evaluation possible chemical (hormone??) imbalance.    She was previously in endocrine clinic in 2015 for low bone density with T-score of -2.0. Please refer to 's note dated 10/14/2015. The patient is not taking any bisphosphonate but will have repeated DXA the end of this year or next year.  She is taking calcium and vitamin D. No fracture.    Her main concern today is to know whether she has chemical imbalance causing allergy and heart disease as nobody in the family has these. She reported having skin rash easily. She did see allergist and noted to have multiple allergy triggers. She said that she gets hungry very easily and almost pass out or develop shaky, sweaty if she does not eat sweet on time. It has been going on for 30 years. Moreover, she reported having symptoms of low blood glucose (hungry, cold, sweating, shakiness) about 2 hours after eating high carb food without protein.  She has never checked her BG during an episode.  She has never passed out from low blood glucose. She reported having vasovagal syncope although she regularly exercises with [employer] and ballroom dance. She reported having food poisoning easily, developed N/V and diarrhea easily. She always has diarrhea after having egg. She has had irritable bowel syndrome.     She went to menopause at age 51. She has not had much of hot flash any more. She sleep well but sometime having hard time going back to  sleep if she wakes up in the middle of the night.     Multiple family members have thyroid disease. Her mother had goiter. Sister takes thyroid medication. Her niece has thyroid cancer. The patient's TSH was normal on 6/28/2017.    Past Medical/Surgical History:  Past Medical History:   Diagnosis Date     Arthritis     Palindromic Rheumatism     Breast tumor      CAD (coronary artery disease)     stenting of the LAD and LCx on 11/30/2012      Cardiac arrhythmia      Cataract, bilateral      Dry eyes     severe     IBS (irritable bowel syndrome)      Optic disc anomalies     tilted     Ptosis      RA (rheumatoid arthritis) (H)      Syncope and collapse      Past Surgical History:   Procedure Laterality Date     APPENDECTOMY  1966     BLEPHAROPLASTY Bilateral 4/2016    doen in [place]     cardiac stents  2012     CHOLECYSTECTOMY  1991     COLONOSCOPY  6/2/2014    Procedure: COMBINED COLONOSCOPY, SINGLE BIOPSY/POLYPECTOMY BY BIOPSY;  Surgeon: Duncan Yanez MD;  Location: [facility]     EXCISE BREAST CYST/FIBROADENOMA/TUMOR/DUCT LESION/NIPPLE LESION/AREOLAR LESION  1984 or '85     EYE SURGERY  02/27/2008    bilateral upper bleph repair     PUNCTAL CLOSURE, PLUGS  about 2007    Dr. Santos / lower in        Allergies:  Allergies   Allergen Reactions     Diltiazem      Serious heart attack symptoms     Ranexa [Ranolazine]      Serious heart attack symptoms      Atenolol Difficulty breathing     Atorvastatin Swelling     Joint swelling     Bacitracin      Red eye     Colchicine Swelling     Cortizone For Kids [Hydrocortisone] Fatigue     Headache also     Lisinopril Swelling and Difficulty breathing     Biaxin [Clarithromycin-Fd&C Yellow #10] Rash     Polymyxin B Rash     Rosuvastatin Rash     Trimox Other (See Comments) and Rash       Current Medications   Current Outpatient Prescriptions   Medication Sig Dispense Refill     CRESTOR 5 MG tablet Take 1 tablet (5 mg) by mouth daily 90 tablet 3     nitroGLYcerin  (NITROSTAT) 0.4 MG sublingual tablet Place 1 tablet (0.4 mg) under the tongue every 5 minutes as needed for chest pain 30 tablet 11     aspirin 81 MG EC tablet Take 1 tablet (81 mg) by mouth daily 90 tablet 3     Artificial Tear Solution (NATURAL BALANCE OP) Apply to eye daily Preservative free both eyes.       clobetasol (TEMOVATE) 0.05 % ointment Use on itchy areas on hands and feet for up to 2 weeks 15 g 1     metroNIDAZOLE (METROCREAM) 0.75 % cream Apply topically 2 times daily To affected areas on the face 45 g 3     VITAMIN D, CHOLECALCIFEROL, PO Take 1,000 Units by mouth daily Every other day       Naproxen Sodium (ALEVE PO) Take 220 mg by mouth daily as needed for moderate pain       Acetaminophen (TYLENOL PO) Take 500 mg by mouth 2 times daily as needed.        Ascorbic Acid (VITAMIN C PO) Take 500 mg by mouth as needed. Pt states she takes this when she gets a cold        calcium carbonate (TUMS) 500 MG chewable tablet Take 1 chew tab by mouth as needed.           Family History:  Family History   Problem Relation Age of Onset     DIABETES Father      Hypertension Father      Cancer - colorectal Father      Glaucoma Sister      sister #1     Macular Degeneration Mother      blindness     OSTEOPOROSIS Mother      hip fracture x 2     Retinal detachment Sister      sister #2     Eye Disorder Sister       'lazy eye'     Cancer - colorectal Sister      and uterine cancer/radiation; bladder cancer     Breast Cancer Sister      in her 30's     CANCER       thyroid/niece in her 20's/alive     Melanoma No family hx of      Skin Cancer No family hx of        Social History:  Social History   Substance Use Topics     Smoking status: Never Smoker     Smokeless tobacco: Never Used     Alcohol use Yes      Comment: social       ROS:  + joint pain  + post-prandial diaphoresis and tremulous once a week  - loss of height  - back pain  Otherwise 10 point negative    Exam  not currently breastfeeding.  Gen: well appearing,  nad, pleasant and conversant  HEENT: anicteric, EOMI, no proptosis or lid lag, no goiter  Cardio: RRR, no m/r/g  Resp: CTAB  Back: no tenderness to palpation of spinous processes, no scoliosis   Ab: soft, NT/ND  Ext: no swelling or edema  Neuro: A&Ox3, normal patellar reflexes     Labs/Imaging  ENDO CALCIUM LABS-[facility] Latest Ref Rng & Units 6/28/2017   CALCIUM 8.5 - 10.1 mg/dL 9.5   MAGNESIUM 1.6 - 2.3 mg/dL    ALBUMIN 3.4 - 5.0 g/dL 4.1   BUN 7 - 30 mg/dL 16   CREATININE 0.52 - 1.04 mg/dL 0.65   ALKPHOS 40 - 150 U/L 82     DXA 10/8/2015    ENDO THYROID LABS-[facility] Latest Ref Rng & Units 6/28/2017   TSH 0.40 - 4.00 mU/L 1.24     Assessment and Plan    Anibal Storey is a 65 y/o female with a hx of CAD s/p stent in 2012, rheumatoid arthritis, low bone density, irritable bowel syndrome, hyperlipidemia who is here for evaluation possible chemical imbalance.    1. Low bone mineral density  2. Rheumatoid arthritis, not on glucocorticoids   3. Post-menopausal  4. Post-prandial hypoglycemia -- stable    Her main concern is to know whether she has chemical (hormone??) imbalance causing allergy and heart disease. It is unclear right now. She does not seem to have any endocrine related issue causing the symptoms described. I reassured her today. However, she insisted for the check blood test today. I will check for some of pituitary and adrenal hormone.     Advised on continue with calcium and vitamin D for low bone density  Recommended to repeat DXA later this year or next year   Continue with exercise  Continue to eat healthy with higher protein.    Bridget Cheng MD    Division of Diabetes and Endocrinology  Department of Medicine  [phone]

## 2017-07-07 NOTE — PATIENT INSTRUCTIONS
To expedite your medication refill(s), please contact your pharmacy and have them fax a refill request to: [phone].  *Please allow 3 business days for routine medication refills.  *Please allow 5 business days for controlled substance medication refills.  --------------------  For scheduling appointments (including lab work), please request an appointment through [employer], or call: [phone].    For questions for your provider or the endocrine nurse, please send a [employer] message.  For after-hours urgent issues, please dial [phone], and ask to speak with the Endocrinologist On-Call.  --------------------  Please Note: If you are active on [employer], all future test results will be sent by [employer] message only and will no longer be sent by mail. You may also receive communication directly from your physician.

## 2017-07-07 NOTE — PROGRESS NOTES
Endocrinology Note    Reason for follow up: she wants to check on chemical imbalance    HPI:  Anibal Storey is a 65 y/o female with a hx of CAD s/p stent in 2012, rheumatoid arthritis, low bone density, irritable bowel syndrome, hyperlipidemia who is here for evaluation possible chemical (hormone??) imbalance.    She was previously in endocrine clinic in 2015 for low bone density with T-score of -2.0. Please refer to 's note dated 10/14/2015. The patient is not taking any bisphosphonate but will have repeated DXA the end of this year or next year.  She is taking calcium and vitamin D. No fracture.    Her main concern today is to know whether she has chemical imbalance causing allergy and heart disease as nobody in the family has these. She reported having skin rash easily. She did see allergist and noted to have multiple allergy triggers. She said that she gets hungry very easily and almost pass out or develop shaky, sweaty if she does not eat sweet on time. It has been going on for 30 years. Moreover, she reported having symptoms of low blood glucose (hungry, cold, sweating, shakiness) about 2 hours after eating high carb food without protein.  She has never checked her BG during an episode.  She has never passed out from low blood glucose. She reported having vasovagal syncope although she regularly exercises with [employer] and ballroom dance. She reported having food poisoning easily, developed N/V and diarrhea easily. She always has diarrhea after having egg. She has had irritable bowel syndrome.     She went to menopause at age 51. She has not had much of hot flash any more. She sleep well but sometime having hard time going back to sleep if she wakes up in the middle of the night.     Multiple family members have thyroid disease. Her mother had goiter. Sister takes thyroid medication. Her niece has thyroid cancer. The patient's TSH was normal on 6/28/2017.    Past Medical/Surgical History:  Past Medical  History:   Diagnosis Date     Arthritis     Palindromic Rheumatism     Breast tumor      CAD (coronary artery disease)     stenting of the LAD and LCx on 11/30/2012      Cardiac arrhythmia      Cataract, bilateral      Dry eyes     severe     IBS (irritable bowel syndrome)      Optic disc anomalies     tilted     Ptosis      RA (rheumatoid arthritis) (H)      Syncope and collapse      Past Surgical History:   Procedure Laterality Date     APPENDECTOMY  1966     BLEPHAROPLASTY Bilateral 4/2016    doen in [place]     cardiac stents  2012     CHOLECYSTECTOMY  1991     COLONOSCOPY  6/2/2014    Procedure: COMBINED COLONOSCOPY, SINGLE BIOPSY/POLYPECTOMY BY BIOPSY;  Surgeon: Duncan Yanez MD;  Location: [facility]     EXCISE BREAST CYST/FIBROADENOMA/TUMOR/DUCT LESION/NIPPLE LESION/AREOLAR LESION  1984 or '85     EYE SURGERY  02/27/2008    bilateral upper bleph repair     PUNCTAL CLOSURE, PLUGS  about 2007    Dr. Santos / lower in [place]       Allergies:  Allergies   Allergen Reactions     Diltiazem      Serious heart attack symptoms     Ranexa [Ranolazine]      Serious heart attack symptoms      Atenolol Difficulty breathing     Atorvastatin Swelling     Joint swelling     Bacitracin      Red eye     Colchicine Swelling     Cortizone For Kids [Hydrocortisone] Fatigue     Headache also     Lisinopril Swelling and Difficulty breathing     Biaxin [Clarithromycin-Fd&C Yellow #10] Rash     Polymyxin B Rash     Rosuvastatin Rash     Trimox Other (See Comments) and Rash       Current Medications   Current Outpatient Prescriptions   Medication Sig Dispense Refill     CRESTOR 5 MG tablet Take 1 tablet (5 mg) by mouth daily 90 tablet 3     nitroGLYcerin (NITROSTAT) 0.4 MG sublingual tablet Place 1 tablet (0.4 mg) under the tongue every 5 minutes as needed for chest pain 30 tablet 11     aspirin 81 MG EC tablet Take 1 tablet (81 mg) by mouth daily 90 tablet 3     Artificial Tear Solution (NATURAL BALANCE OP) Apply to eye daily  Preservative free both eyes.       clobetasol (TEMOVATE) 0.05 % ointment Use on itchy areas on hands and feet for up to 2 weeks 15 g 1     metroNIDAZOLE (METROCREAM) 0.75 % cream Apply topically 2 times daily To affected areas on the face 45 g 3     VITAMIN D, CHOLECALCIFEROL, PO Take 1,000 Units by mouth daily Every other day       Naproxen Sodium (ALEVE PO) Take 220 mg by mouth daily as needed for moderate pain       Acetaminophen (TYLENOL PO) Take 500 mg by mouth 2 times daily as needed.        Ascorbic Acid (VITAMIN C PO) Take 500 mg by mouth as needed. Pt states she takes this when she gets a cold        calcium carbonate (TUMS) 500 MG chewable tablet Take 1 chew tab by mouth as needed.           Family History:  Family History   Problem Relation Age of Onset     DIABETES Father      Hypertension Father      Cancer - colorectal Father      Glaucoma Sister      sister #1     Macular Degeneration Mother      blindness     OSTEOPOROSIS Mother      hip fracture x 2     Retinal detachment Sister      sister #2     Eye Disorder Sister       'lazy eye'     Cancer - colorectal Sister      and uterine cancer/radiation; bladder cancer     Breast Cancer Sister      in her 30's     CANCER       thyroid/niece in her 20's/alive     Melanoma No family hx of      Skin Cancer No family hx of        Social History:  Social History   Substance Use Topics     Smoking status: Never Smoker     Smokeless tobacco: Never Used     Alcohol use Yes      Comment: social       ROS:  + joint pain  + post-prandial diaphoresis and tremulous once a week  - loss of height  - back pain  Otherwise 10 point negative    Exam  not currently breastfeeding.  Gen: well appearing, nad, pleasant and conversant  HEENT: anicteric, EOMI, no proptosis or lid lag, no goiter  Cardio: RRR, no m/r/g  Resp: CTAB  Back: no tenderness to palpation of spinous processes, no scoliosis   Ab: soft, NT/ND  Ext: no swelling or edema  Neuro: A&Ox3, normal patellar reflexes      Labs/Imaging  ENDO CALCIUM LABS-[facility] Latest Ref Rng & Units 6/28/2017   CALCIUM 8.5 - 10.1 mg/dL 9.5   MAGNESIUM 1.6 - 2.3 mg/dL    ALBUMIN 3.4 - 5.0 g/dL 4.1   BUN 7 - 30 mg/dL 16   CREATININE 0.52 - 1.04 mg/dL 0.65   ALKPHOS 40 - 150 U/L 82     DXA 10/8/2015    ENDO THYROID LABS[facility] Latest Ref Rng & Units 6/28/2017   TSH 0.40 - 4.00 mU/L 1.24     Assessment and Plan    Anibal Storey is a 67 y/o female with a hx of CAD s/p stent in 2012, rheumatoid arthritis, low bone density, irritable bowel syndrome, hyperlipidemia who is here for evaluation possible chemical imbalance.    1. Low bone mineral density  2. Rheumatoid arthritis, not on glucocorticoids   3. Post-menopausal  4. Post-prandial hypoglycemia -- stable    Her main concern is to know whether she has chemical (hormone??) imbalance causing allergy and heart disease. It is unclear right now. She does not seem to have any endocrine related issue causing the symptoms described. I reassured her today. However, she insisted for the check blood test today. I will check for some of pituitary and adrenal hormone.     Advised on continue with calcium and vitamin D for low bone density  Recommended to repeat DXA later this year or next year   Continue with exercise  Continue to eat healthy with higher protein.    Bridget Cheng MD    Division of Diabetes and Endocrinology  Department of Medicine  [phone]

## 2017-07-07 NOTE — MR AVS SNAPSHOT
After Visit Summary   7/7/2017    Anibal Storey    MRN: [id number]           Patient Information     Date Of Birth          1950        Visit Information        Provider Department      7/7/2017 2:20 PM Bridget Cheng MD [facility]        Today's Diagnoses     Post-menopausal    -  1    Syncope, unspecified syncope type        Dizziness        Palpitations        Hyperlipidemia LDL goal <100        Coronary artery disease involving native coronary artery of native heart without angina pectoris        Screening for diabetes mellitus        Other long term (current) drug therapy           Care Instructions    To expedite your medication refill(s), please contact your pharmacy and have them fax a refill request to: [phone].  *Please allow 3 business days for routine medication refills.  *Please allow 5 business days for controlled substance medication refills.  --------------------  For scheduling appointments (including lab work), please request an appointment through [employer], or call: [phone].    For questions for your provider or the endocrine nurse, please send a [employer] message.  For after-hours urgent issues, please dial [phone], and ask to speak with the Endocrinologist On-Call.  --------------------  Please Note: If you are active on [employer], all future test results will be sent by [employer] message only and will no longer be sent by mail. You may also receive communication directly from your physician.            Follow-ups after your visit        Your next 10 appointments already scheduled     Jul 07, 2017  3:00 PM CDT   LAB with  LAB   [facility] ([facility])    [address] [place] [id number] [phone]           Patient must bring picture ID.  Patient should be prepared to give a urine specimen  Please do not eat 10-12 hours before your appointment if you are coming in fasting for labs on  "lipids, cholesterol, or glucose (sugar).  Pregnant women should follow their Care Team instructions. Water with medications is okay. Do not drink coffee or other fluids.   If you have concerns about taking  your medications, please ask at office or if scheduling via [employer], send a message by clicking on Secure Messaging, Message Your Care Team.            Jul 12, 2017  1:00 PM CDT   (Arrive by 12:45 PM)   MA DIAGNOSTIC BILATERAL W/ MERLINE with [id number]   [facility] ([facility])    [address] [place] [address] [phone]           Do not use any powder, lotion or deodorant under your arms or on your breast. If you do, we will ask you to remove it before your exam.  Wear comfortable, two-piece clothing.  If you have any allergies, tell your care team.  Bring any previous mammograms from other facilities or have them mailed to the breast center.  Three-dimensional (3D) mammograms are available at [place] locations in [facility] and [place]. Benefits of 3D mammograms include: - Improved rate of cancer detection - Decreases your chance of having to go back for more tests, which means fewer: - \"False-positive\" results (This means that there is an abnormal area but it isn't cancer.) - Invasive testing procedures, such as a biopsy or surgery - Can provide clearer images of the breast if you have dense breast tissue. 3D mammography is an optional exam that anyone can have with a 2D mammogram. It doesn't replace or take the place of a 2D mammogram. 2D mammograms remain an effective screening test for all women.  Not all insurance companies cover the cost of a 3D mammogram. Check with your insurance.            Jul 12, 2017  1:30 PM CDT   US BREAST LEFT LIMITED 1-3 QUAD with [email or web address]   [facility] ([facility])    [address] [place] [id number] [phone] "           Please bring a list of your medicines (including vitamins, minerals and over-the-counter drugs). Also, tell your doctor about any allergies you may have. Wear comfortable clothes and leave your valuables at home.  You do not need to do anything special to prepare for your exam.  Please call the Imaging Department at your exam site with any questions.            Jul 17, 2017   Procedure with Duncan Crow MD   [facility] [place] End[facility] ([facility])    [address] [address]   [phone]           [facility] is located on the corner of [address] and [address] on the [facility]. It is easily accessible from virtually any point in the [place] area, via [address] and [address].            Aug 04, 2017  9:00 AM CDT   (Arrive by 8:45 AM)   NEW WITH ROOM with Betzaida Rouse GC,  2 114 CONSULT RM   [facility] ([facility] [place])    [address]  [address] [facility] [address] [phone]            Aug 04, 2017 10:15 AM CDT   [facility] Lab Draw with  [facility] LAB DRAW   [facility] ([facility])    [address]  [address] [facility] [address] [phone]            Aug 10, 2017  9:15 AM CDT   RETURN GENERAL with Morgan Munguia MD   [facility] ([facility])    [address]  [address]  [address] [address] [facility] [address]  [facility] [address]   [phone]              Future tests that were ordered for you today     Open Future Orders        Priority Expected Expires Ordered    Hemoglobin A1c Routine 7/7/2017 7/7/2018 7/7/2017    Follicle stimulating hormone Routine 7/7/2017 7/7/2018 7/7/2017    Prolactin Routine 7/7/2017 7/7/2018 7/7/2017    Insulin growth factor 1 Routine 7/7/2017 7/7/2018 7/7/2017    Creatinine timed urine Routine 7/7/2017 7/7/2018 7/7/2017    Metanephrine random  "or 24 hr urine Routine 7/7/2017 7/7/2018 7/7/2017    Cortisol free urine Routine 7/7/2017 7/7/2018 7/7/2017    5-HIAA quantitative urine Routine 7/7/2017 7/7/2018 7/7/2017    Tryptase Routine 7/7/2017 7/7/2018 7/7/2017            Who to contact     Please call your clinic at [phone] to:    Ask questions about your health    Make or cancel appointments    Discuss your medicines    Learn about your test results    Speak to your doctor   If you have compliments or concerns about an experience at your clinic, or if you wish to file a complaint, please contact [facility] Patient Relations at [phone] or email us at [email or web address]         Additional Information About Your Visit        [employer] Information     [employer] gives you secure access to your electronic health record. If you see a primary care provider, you can also send messages to your care team and make appointments. If you have questions, please call your primary care clinic.  If you do not have a primary care provider, please call [phone] and they will assist you.      [employer] is an electronic gateway that provides easy, online access to your medical records. With [employer], you can request a clinic appointment, read your test results, renew a prescription or communicate with your care team.     To access your existing account, please contact your [facility] Clinic or call [phone] for assistance.        Care EveryWhere ID     This is your Care EveryWhere ID. This could be used by other organizations to access your [place] medical records  [id number]        Your Vitals Were     Pulse Height BMI (Body Mass Index)             78 1.6 m (5' 3\") 23.81 kg/m2          Blood Pressure from Last 3 Encounters:   07/07/17 131/81   06/29/17 123/81   06/28/17 135/83    Weight from Last 3 Encounters:   07/07/17 61 kg (134 lb 6.4 oz)   06/29/17 60.4 kg (133 lb 3.2 oz)   06/28/17 59.9 kg (132 " alfonzo)               Primary Care Provider Office Phone # Fax #    Roque Almaguer [phone]       [facility] [address] [place] [id number]        Equal Access to Services     COURTNEY FRANCIS : Hadherminia bertrand banda sybilo Soradhamesali, waaxda luqadaha, qaybta kaalmada adeegyada, jaden mata laTonysusan macdonald. So [facility] [phone].    ATENCIÓN: Si habla español, tiene a mata disposición servicios gratuitos de asistencia lingüística. Llame al [phone].    We comply with applicable federal civil rights laws and [place] laws. We do not discriminate on the basis of race, color, national origin, age, disability sex, sexual orientation or gender identity.            Thank you!     Thank you for choosing [facility]  for your care. Our goal is always to provide you with excellent care. Hearing back from our patients is one way we can continue to improve our services. Please take a few minutes to complete the written survey that you may receive in the mail after your visit with us. Thank you!             Your Updated Medication List - Protect others around you: Learn how to safely use, store and throw away your medicines at www.disposemymeds.org.          This list is accurate as of: 7/7/17  2:53 PM.  Always use your most recent med list.                   Brand Name Dispense Instructions for use Diagnosis    ALEVE PO      Take 220 mg by mouth daily as needed for moderate pain        aspirin 81 MG EC tablet     90 tablet    Take 1 tablet (81 mg) by mouth daily    Chest pain at rest, Hyperlipidemia LDL goal <100, Coronary artery disease involving native coronary artery of native heart without angina pectoris       calcium carbonate 500 MG chewable tablet    TUMS     Take 1 chew tab by mouth as needed.        clobetasol 0.05 % ointment    TEMOVATE    15 g    Use on itchy areas on hands and feet for up to 2 weeks    Dyshidrotic eczema       CRESTOR 5 MG tablet   Generic  drug:  rosuvastatin     90 tablet    Take 1 tablet (5 mg) by mouth daily    Hyperlipidemia LDL goal <100, Chest pain at rest, Coronary artery disease involving native coronary artery of native heart without angina pectoris       metroNIDAZOLE 0.75 % cream    METROCREAM    45 g    Apply topically 2 times daily To affected areas on the face    Rosacea       NATURAL BALANCE OP      Apply to eye daily Preservative free both eyes.        nitroGLYcerin 0.4 MG sublingual tablet    NITROSTAT    30 tablet    Place 1 tablet (0.4 mg) under the tongue every 5 minutes as needed for chest pain    Chest pain at rest, Hyperlipidemia LDL goal <100, Coronary artery disease involving native coronary artery of native heart without angina pectoris       TYLENOL PO      Take 500 mg by mouth 2 times daily as needed.        VITAMIN C PO      Take 500 mg by mouth as needed. Pt states she takes this when she gets a cold        VITAMIN D (CHOLECALCIFEROL) PO      Take 1,000 Units by mouth daily Every other day

## 2017-07-10 DIAGNOSIS — R00.2 PALPITATIONS: ICD-10-CM

## 2017-07-10 DIAGNOSIS — R55 SYNCOPE, UNSPECIFIED SYNCOPE TYPE: ICD-10-CM

## 2017-07-10 DIAGNOSIS — Z78.0 POST-MENOPAUSAL: ICD-10-CM

## 2017-07-10 DIAGNOSIS — R42 DIZZINESS: ICD-10-CM

## 2017-07-10 LAB
COLLECT DURATION TIME UR: 24 H
CREAT 24H UR-MRATE: 0.98 G/(24.H) (ref 0.8–1.8)
CREAT UR-MCNC: 53 MG/DL
SPECIMEN VOL UR: 1850 ML

## 2017-07-11 ENCOUNTER — TELEPHONE (OUTPATIENT)
Dept: GASTROENTEROLOGY | Facility: CLINIC | Age: 67
End: 2017-07-11

## 2017-07-11 DIAGNOSIS — Z12.11 ENCOUNTER FOR SCREENING COLONOSCOPY: Primary | ICD-10-CM

## 2017-07-11 LAB
IGF-I BLD-MCNC: 74 NG/ML (ref 34–241)
TRYPTASE SERPL-MCNC: 4.2 NG/ML

## 2017-07-11 NOTE — TELEPHONE ENCOUNTER
Patient scheduled for Colonoscopy    Indication for procedure. Tubular adenoma    Referring Provider. Roque Almaguer MD    ? Not Needed    Arrival time verified? Yes, 0940    Facility location verified? Yes, [address]    Instructions given regarding prep and procedure    Prep Type Golytely    Are you taking any anticoagulants or blood thinners? Aspirin    Instructions given? Stopped    Electronic implanted devices? No    Pre procedure teaching completed? Yes    Transportation from procedure?     H&P / Pre op physical completed? N/A

## 2017-07-17 ENCOUNTER — SURGERY (OUTPATIENT)
Age: 67
End: 2017-07-17

## 2017-07-17 ENCOUNTER — HOSPITAL ENCOUNTER (OUTPATIENT)
Facility: CLINIC | Age: 67
Discharge: HOME | End: 2017-07-17
Attending: INTERNAL MEDICINE | Admitting: INTERNAL MEDICINE
Payer: MEDICARE

## 2017-07-17 VITALS
OXYGEN SATURATION: 97 % | RESPIRATION RATE: 12 BRPM | HEART RATE: 74 BPM | SYSTOLIC BLOOD PRESSURE: 151 MMHG | DIASTOLIC BLOOD PRESSURE: 79 MMHG

## 2017-07-17 LAB — COLONOSCOPY: NORMAL

## 2017-07-17 PROCEDURE — G0500 MOD SEDAT ENDO SERVICE >5YRS: HCPCS | Performed by: INTERNAL MEDICINE

## 2017-07-17 PROCEDURE — 25000128 H RX IP 250 OP 636: Performed by: INTERNAL MEDICINE

## 2017-07-17 PROCEDURE — 99153 MOD SED SAME PHYS/QHP EA: CPT | Performed by: INTERNAL MEDICINE

## 2017-07-17 PROCEDURE — 45385 COLONOSCOPY W/LESION REMOVAL: CPT | Mod: PT | Performed by: INTERNAL MEDICINE

## 2017-07-17 PROCEDURE — 88305 TISSUE EXAM BY PATHOLOGIST: CPT | Performed by: INTERNAL MEDICINE

## 2017-07-17 RX ORDER — FENTANYL CITRATE 50 UG/ML
INJECTION, SOLUTION INTRAMUSCULAR; INTRAVENOUS PRN
Status: DISCONTINUED | OUTPATIENT
Start: 2017-07-17 | End: 2017-07-17 | Stop reason: HOSPADM

## 2017-07-17 RX ADMIN — FENTANYL CITRATE 50 MCG: 50 INJECTION, SOLUTION INTRAMUSCULAR; INTRAVENOUS at 10:48

## 2017-07-17 RX ADMIN — MIDAZOLAM HYDROCHLORIDE 2 MG: 1 INJECTION, SOLUTION INTRAMUSCULAR; INTRAVENOUS at 10:48

## 2017-07-17 NOTE — IP AVS SNAPSHOT
MRN:[id number]                      After Visit Summary   7/17/2017    Anibal Storey    MRN: [id number]           Thank you!     Thank you for choosing [facility] for your care. Our goal is always to provide you with excellent care. Hearing back from our patients is one way we can continue to improve our services. Please take a few minutes to complete the written survey that you may receive in the mail after you visit with us. Thank you!        Patient Information     Date Of Birth          1950        About your hospital stay     You were admitted on:  July 17, 2017 You last received care in the:  [facility], Endoscopy    You were discharged on:  July 17, 2017       Who to Call     For medical emergencies, please call 911.  For non-urgent questions about your medical care, please call your primary care provider or clinic, [phone]  For questions related to your surgery, please call your surgery clinic        Attending Provider     Provider Specialty    Duncan Yanez MD Gastroenterology       Primary Care Provider Office Phone # Fax #    Roque Almaguer [phone]      Your next 10 appointments already scheduled     Aug 04, 2017  9:00 AM CDT   (Arrive by 8:45 AM)   NEW WITH ROOM with Betzaida Rouse GC,  2 114 CONSULT RM   [facility] ([facility] [place])    [address] [place] [id number] [phone]            Aug 04, 2017 10:15 AM CDT   [facility]   [facility] Lab Draw ([facility])    [address] [id number] [phone]            Aug 10, 2017  9:15 AM CDT   RETURN GENERAL with Morgan Munguia MD   [facility] ([facility])    [address]  [address] [facility]  9[address]  [facility] [id number] [phone]              Pending Results     No orders found from  7/15/2017 to 7/18/2017.            Admission Information     Date & Time Provider Department Dept. Phone    7/17/2017 Duncan Yanez MD [facility], [place], Endoscopy [phone]      Your Vitals Were     Blood Pressure Pulse Respirations Pulse Oximetry          151/79 74 22 98%        MyChart Information     [employer] gives you secure access to your electronic health record. If you see a primary care provider, you can also send messages to your care team and make appointments. If you have questions, please call your primary care clinic.  If you do not have a primary care provider, please call [phone] and they will assist you.        Care EveryWhere ID     This is your Care EveryWhere ID. This could be used by other organizations to access your [place] medical records  [id number]        Equal Access to Services     COURTNEY FRANCIS : Enrrique Fitzgerald, walakshmi goldsteinqloco, ann marie kaalbridgett hugo, jaden macdonald. So [facility] [phone].    ATENCIÓN: Si habla español, tiene a mata disposición servicios gratuitos de asistencia lingüística. Llame al [phone].    We comply with applicable federal civil rights laws and [place] laws. We do not discriminate on the basis of race, color, national origin, age, disability sex, sexual orientation or gender identity.               Review of your medicines      UNREVIEWED medicines. Ask your doctor about these medicines        Dose / Directions    ALEVE PO        Dose:  220 mg   Take 220 mg by mouth daily as needed for moderate pain   Refills:  0       aspirin 81 MG EC tablet   Used for:  Chest pain at rest, Hyperlipidemia LDL goal <100, Coronary artery disease involving native coronary artery of native heart without angina pectoris        Dose:  81 mg   Take 1 tablet (81 mg) by mouth daily   Quantity:  90 tablet   Refills:  3       calcium carbonate 500 MG chewable tablet   Commonly known as:  TUMS        Dose:  1 chew tab   Take 1  chew tab by mouth as needed.   Refills:  0       clobetasol 0.05 % ointment   Commonly known as:  TEMOVATE   Used for:  Dyshidrotic eczema        Use on itchy areas on hands and feet for up to 2 weeks   Quantity:  15 g   Refills:  1       CRESTOR 5 MG tablet   Used for:  Hyperlipidemia LDL goal <100, Chest pain at rest, Coronary artery disease involving native coronary artery of native heart without angina pectoris   Generic drug:  rosuvastatin        Dose:  5 mg   Take 1 tablet (5 mg) by mouth daily   Quantity:  90 tablet   Refills:  3       metroNIDAZOLE 0.75 % cream   Commonly known as:  METROCREAM   Used for:  Rosacea        Apply topically 2 times daily To affected areas on the face   Quantity:  45 g   Refills:  3       NATURAL BALANCE OP        Apply to eye daily Preservative free both eyes.   Refills:  0       nitroGLYcerin 0.4 MG sublingual tablet   Commonly known as:  NITROSTAT   Used for:  Chest pain at rest, Hyperlipidemia LDL goal <100, Coronary artery disease involving native coronary artery of native heart without angina pectoris        Dose:  0.4 mg   Place 1 tablet (0.4 mg) under the tongue every 5 minutes as needed for chest pain   Quantity:  30 tablet   Refills:  11       TYLENOL PO        Dose:  500 mg   Take 500 mg by mouth 2 times daily as needed.   Refills:  0       VITAMIN C PO        Dose:  500 mg   Take 500 mg by mouth as needed. Pt states she takes this when she gets a cold   Refills:  0       VITAMIN D (CHOLECALCIFEROL) PO        Dose:  1000 Units   Take 1,000 Units by mouth daily Every other day   Refills:  0                Protect others around you: Learn how to safely use, store and throw away your medicines at www.disposemymeds.org.             Medication List: This is a list of all your medications and when to take them. Check marks below indicate your daily home schedule. Keep this list as a reference.      Medications           Morning Afternoon Evening Bedtime As Needed    ALEVE PO    Take 220 mg by mouth daily as needed for moderate pain                                aspirin 81 MG EC tablet   Take 1 tablet (81 mg) by mouth daily                                calcium carbonate 500 MG chewable tablet   Commonly known as:  TUMS   Take 1 chew tab by mouth as needed.                                clobetasol 0.05 % ointment   Commonly known as:  TEMOVATE   Use on itchy areas on hands and feet for up to 2 weeks                                CRESTOR 5 MG tablet   Take 1 tablet (5 mg) by mouth daily   Generic drug:  rosuvastatin                                metroNIDAZOLE 0.75 % cream   Commonly known as:  METROCREAM   Apply topically 2 times daily To affected areas on the face                                NATURAL BALANCE OP   Apply to eye daily Preservative free both eyes.                                nitroGLYcerin 0.4 MG sublingual tablet   Commonly known as:  NITROSTAT   Place 1 tablet (0.4 mg) under the tongue every 5 minutes as needed for chest pain                                TYLENOL PO   Take 500 mg by mouth 2 times daily as needed.                                VITAMIN C PO   Take 500 mg by mouth as needed. Pt states she takes this when she gets a cold                                VITAMIN D (CHOLECALCIFEROL) PO   Take 1,000 Units by mouth daily Every other day

## 2017-07-17 NOTE — IP AVS SNAPSHOT
[facility], [place], Endoscopy    [address] [address]    Phone:  [phone]                                       After Visit Summary   7/17/2017    Anibal Storey    MRN: [id number]           After Visit Summary Signature Page     I have received my discharge instructions, and my questions have been answered. I have discussed any challenges I see with this plan with the nurse or doctor.    ..........................................................................................................................................  Patient/Patient Representative Signature      ..........................................................................................................................................  Patient Representative Print Name and Relationship to Patient    ..................................................               ................................................  Date                                            Time    ..........................................................................................................................................  Reviewed by Signature/Title    ...................................................              ..............................................  Date                                                            Time

## 2017-07-18 LAB — COPATH REPORT: NORMAL

## 2017-08-04 ENCOUNTER — OFFICE VISIT (OUTPATIENT)
Dept: ONCOLOGY | Facility: CLINIC | Age: 67
End: 2017-08-04
Attending: GENETIC COUNSELOR, MS
Payer: MEDICARE

## 2017-08-04 DIAGNOSIS — Z80.49 FAMILY HISTORY OF UTERINE CANCER: ICD-10-CM

## 2017-08-04 DIAGNOSIS — Z80.52 FAMILY HISTORY OF BLADDER CANCER: ICD-10-CM

## 2017-08-04 DIAGNOSIS — Z80.3 FAMILY HISTORY OF MALIGNANT NEOPLASM OF BREAST: ICD-10-CM

## 2017-08-04 DIAGNOSIS — Z80.0 FAMILY HISTORY OF COLON CANCER: ICD-10-CM

## 2017-08-04 DIAGNOSIS — Z80.3 FAMILY HISTORY OF MALIGNANT NEOPLASM OF BREAST: Primary | ICD-10-CM

## 2017-08-04 PROCEDURE — 96040 ZZH GENETIC COUNSELING, EACH 30 MINUTES: CPT | Mod: ZF | Performed by: GENETIC COUNSELOR, MS

## 2017-08-04 NOTE — PATIENT INSTRUCTIONS
Assessing Cancer Risk  Only about 5-10% of cancers are thought to be due to an inherited cancer susceptibility gene.    These families often have:    Several people with the same or related types of cancer    Cancers diagnosed at a young age (before age 50)    Individuals with more than one primary cancer    Multiple generations of the family affected with cancer    Some people may be candidates for genetic testing of more than one gene.  For these families, genetic testing using a multi-gene cancer panel may be offered.  These panels may test genes known to increase the risk for breast (and other) cancers: DOMINIK, BRCA1, BRCA2, CDH1, CHEK2, PALB2, PTEN, and TP53.  The purpose of this handout is to serve as a brief summary of the high/moderate-risk breast cancer genes which have published clinical management guidelines for individuals who are found to carry a mutation.    Hereditary Breast and Ovarian Cancer Syndrome (HBOC)  A single mutation in one of the copies of BRCA1 or BRCA2 increases the risk for breast and ovarian cancer, among others.  The risk for pancreatic cancer and melanoma may also be slightly increased in some families.  The tables below list the chance that someone with a BRCA mutation would develop cancer in his or her lifetime1,2,3,4.          Women   Men     General Population BRCA+    General Population BRCA+   Breast  12% 40-80%  Breast <1% ~7%   Ovarian  1-2% 10-40%  Prostate 16% 20%     A person s ethnic background is also important to consider, as individuals of Ashkenazi Anglican ancestry have a higher chance of having a BRCA gene mutation.  There are three BRCA mutations that occur more frequently in this population.    Li-Fraumeni Syndrome (LFS)  LFS is a cancer predisposition syndrome. Individuals with LFS are at an increased risk for developing cancer at a young age. The general lifetime risk for development of cancer is 50% by age 30 and 90% by age 60.  LFS is caused by a mutation in the  TP53 gene.  A single mutation in one of the copies of TP53 increases the risk for multiple cancers.     Core Cancers: Sarcomas, Breast, Brain, Lung, Leukemias/Lymphomas, Adrenocortical carcinomas  Other Cancers: Gastrointestinal, Thyroid, Skin, Genitourinary        Cowden Syndrome  Cowden syndrome is a hereditary condition that increases the risk for breast, thyroid, endometrial, and kidney cancer.  Cowden syndrome is caused by a mutation in the PTEN gene.  A single mutation in one of the copies of PTEN causes Cowden syndrome and increases cancer risk.  The table below shows the chance that someone with a PTEN mutation would develop cancer in their lifetime5,6.  Other benign features seen in some individuals with Cowden syndrome include benign skin lesions (facial papules, keratoses, lipomas), learning disability, autism, thyroid nodules, colon polyps, and larger head size.      Lifetime Cancer Risk   Cancer Type General Population Cowden Syndrome   Breast  12% 25-50%*   Thyroid  1% 35%   Renal 1-2% 35%   Endometrial  2-3% 28%   Colon 5% 9%   Melanoma 2-3% >5%     *One recent study found breast cancer risk to be increased to 85%    Hereditary Diffuse Gastric Cancer (HDGC)  Currently, one gene is known to cause hereditary diffuse gastric cancer: CDH1.  Individuals with HDGC are at increased risk for diffuse gastric cancer and lobular breast cancer. Of people diagnosed with HDGC, 30-50% have a mutation in the CDH1 gene.  This suggests there are likely other genes that may cause HDGC that have not been identified yet.      Lifetime Cancer Risks    General Pop HDGC    Diffuse Gastric  <1% ~80%   Breast 12% 39-52%     Additional Genes Associated with Increased Breast Cancer Risk  PALB2  Mutations in PALB2 have been shown to increase the risk of breast cancer up to 33-58% in some families; where individuals fall within this risk range is dependent upon family history.9 PALB2 mutations have also been associated with  increased risk for pancreatic cancer, although this risk has not been quantified yet.  Individuals who inherit two PALB2 mutations--one from their mother and one from their father--have a condition called Fanconi Anemia.  This rare autosomal recessive condition is associated with short stature, developmental delay, bone marrow failure, and increased risk for childhood cancers.    DOMINIK  DOMINIK is a moderate-risk breast cancer gene. Women who have a mutation in DOMINIK can have between a 2-4 fold increased risk for breast cancer compared to the general population.10  DOMINIK mutations have also been associated with increased risk for pancreatic cancer, however an estimate of this cancer risk is not well understood.11  Individuals who inherit two DOMINIK mutations have a condition called ataxia-telangiectasia (AT).  This rare autosomal recessive condition affects the nervous system and immune system, and is associated with progressive cerebellar ataxia beginning in childhood.  Individuals with ataxia-telangiectasia often have a weakened immune system and have an increased risk for childhood cancers.           CHEK2   CHEK2 is a moderate-risk breast cancer gene.  Women who have a mutation in CHEK2 have around a 2-fold increased risk for breast cancer compared to the general population, and this risk may be higher depending upon family history.12,13,14  Mutations in CHEK2 have also been shown to increase the risk of a number of other cancers, including colon and prostate, however these cancer risks are currently not well understood.         Inheritance   All of the genes reviewed above are inherited in an autosomal dominant pattern.  This means that if a parent has a mutation, each of his or her children will have a 50% chance of inheriting that same mutation.  Therefore, each child--male or female--would have a 50% chance of being at increased risk for developing cancer.      Image obtained from [employer] Reference, 2013      Mutations in some genes can occur de venkata, which means that a person s mutation occurred for the first time in them and was not inherited from a parent.  Now that they have the mutation, however, it can be passed on to future generations.    Genetic Testing  Genetic testing involves a blood test and will look for any harmful mutations within genes that are associated with increased cancer risk.  If possible, it is recommended that the person(s) who has had cancer be tested before other family members.  That person will give us the most useful information about whether or not a specific gene is associated with the cancer in the family.    Results  There are three possible results of genetic testing:    Positive--a harmful mutation was identified in one or more of the genes    Negative--no mutation was identified in any of the genes on this panel    Variant of unknown significance--a variation in one of the genes was identified, but it is unclear how this impacts cancer risk in the family    Advantages and Disadvantages  There are advantages and disadvantages to genetic testing.  Advantages    May clarify your cancer risk    Can help you make medical decisions    May explain the cancers in your family    May give useful information to your family members (if you share your results)    Disadvantages    Possible negative emotional impact of learning about inherited cancer risk    Uncertainty in interpreting a negative test result in some situations    Possible genetic discrimination concerns (see below)    Genetic Information Nondiscrimination Act (REESE)  REESE is a federal law that protects individuals from health insurance or employment discrimination based on a genetic test result alone.  Although rare, there are currently no legal discrimination protections in terms of life insurance, long term care, or disability insurances.  Visit the National Human Genome Research [place] genome.gov/47609998 to learn  more.    Reducing Cancer Risk  Each of the genes listed within this handout have nationally recognized cancer screening guidelines that would be recommended for individuals who test positive.  In addition to increased cancer screening, surgeries may be offered or recommended to reduce cancer risk.  Recommendations are based upon an individual s genetic test result as well as their personal and family history of cancer.    Questions to Think About Regarding Genetic Testing    What effect will the test result have on me and my relationship with my family members if I have an inherited gene mutation?  If I don t have a gene mutation?    Should I share my test results, and how will my family react to this news, which may also affect them?    Are my children ready to learn new information that may one day affect their own health?      Resources  FORCE: Facing Our Risk of Cancer Empowered facingourrisk.org   Bright Pink bebrightpink.org   Li-Fraumeni Syndrome Association lfsassociation.org   PTEN World PTENworld.com   No stomach for cancer, Inc. nostomachforcancer.org   Stomach cancer relief network scrnet.org   Collaborative Group of the Americas on Inherited Colorectal Cancer (CGA) cgaicc.com    Cancer Care cancercare.org   American Cancer Society (ACS) cancer.org   National Cancer [place] (NCI) cancer.gov     Cancer Risk Management Program [phone] ([phone])  ? Bella Clair, MS, [facility] [place]  [phone]  ? Betzaida Padma, MS, [facility] [place]  [phone]  ? Hannah Berger, MS, [facility] [place]  [phone]  ? Ghislaine Taylor, MS, [facility] [place]  [phone]   ? Yue Kirkland, MS, [facility] [place]  [phone]    References  1. Unruly Martínez PDP, Yi S, Tai GIBSON, Geovanni JE, Clifton JL, Desireeman N, Alyse H, Tina O, Dayan A, Landy B, Deandre P, Forrest S, Corazon DM, Andi N, Wilmer E, Neo H, Jaun E, Bella J, Tamra J, Javier B, Tulinius H, Thorlacius S, Eerola H, Mitlon H, Cathryn K, Amish OP. Average risks of breast and ovarian  cancer associated with BRCA1 or BRCA2 mutations detected in case series unselected for family history: a combined analysis of 222 studies. Am J Hum Tala. 2003;72:1117-30.  2. Antonina N, Cecilia SIERRA, Pricila G.  BRCA1 and BRCA2 Hereditary Breast and Ovarian Cancer. Gene Reviews online. 2013.  3. Rex YC, May S, Yareli G, Renee S. Breast cancer risk among male BRCA1 and BRCA2 mutation carriers. J Natl Cancer Inst. 2007;99:1811-4.  4. Patrick MACHUCA, Lopez I, Eddie J, Tevin E, Ian ER, Hilton F. Risk of breast cancer in male BRCA2 carriers. J Med Tala. 2010;47:710-1.  5. Xiang MH, Tin J, Anne J, Jean-Pierre BECERRA, Edwige MS, Raj C. Lifetime cancer risks in individuals with germline PTEN mutations. Clin Cancer Res. 2012;18:400-7.  6. Karynki R. Cowden Syndrome: A Critical Review of the Clinical Literature. J Tala . 2009:18:13-27.  7. National Comprehensive Cancer Network. Clinical practice guidelines in oncology, colorectal cancer screening. Available online (registration required). 2013.  8. National Cancer [place]. SEER Cancer Stat Fact Sheets.  December 2013.  9. Edna CORONADO, et al. Breast-Cancer Risk in Families with Mutations in PALB2. NEJM. 2014; 371(6):497-506.  10. Nasim GARZA, Juan D, Reba S, Leonor P, Felicei T, Angel M, Lucas B, Elizabeth H, Nell R, Kassandra K, Miguel Angel L, Patrick MACHUCA, Corazon D, Giorgio DF, Rhona MR, The Breast Cancer Susceptibility Collaboration ([place]) & Bartolome N. DOMINIK mutations that cause ataxia-telangiectasia are breast cancer susceptibility alleles. Nature Genetics. 2006;38:873-875  11. Oracio N , Nik Y, Jessie AVENDAÑO, Niurka L, Haile GM , Ganga ML, Sia S, Muniz AG, Syngal S, Christiane ML, Yumiko J , Anita R, Hector SZ, Tano JR, Betzaida VE, Eder M, Vogelstein B, Ronel N, Paco RH, Franck KW, and Jered AP. DOMINIK mutations in patients with hereditary pancreatic cancer. Cancer Discover. 2012;2:41-46  12. CHEK2 Breast Cancer Case-Control Consortium.  CHEK2*1100delC and susceptibility to breast cancer: A collaborative analysis involving 10,860 breast cancer cases and 9,065 controls from 10 studies. Am J Hum Tala, 74 (2004), pp. 9066-2257  13. Elizabeth T, Tommy S, Willis K, et al. Spectrum of Mutations in BRCA1, BRCA2, CHEK2, and TP53 in Families at High Risk of Breast Cancer. TOBY. 2006;295(12):2002-2473.   14. Eric WALKER, Kamilah ROBERTS, Anya GARZA, et al. Risk of breast cancer in women with a CHEK2 mutation with and without a family history of breast cancer. J Clin Oncol. 2011;29:5857-4526        Assessing Cancer Risk  Only about 5-10% of cancers are thought to be due to an inherited cancer susceptibility gene.    These families often have:    Several people with the same or related types of cancer    Cancers diagnosed at a young age (before age 50)    Individuals with more than one primary cancer    Multiple generations of the family affected with cancer    Terry Syndrome Genes  We each inherit two copies of every gene in our bodies: one from our mother and one from our father.  Each gene has a specific job to do.  When a gene has a mistake or  mutation  in it, it does not work like it should. Currently five genes are known to cause Terry Syndrome: MLH1, MSH2, MSH6, PMS2, and EPCAM.  A single mutation in one of the Terry Syndrome genes increases the risk for colon, endometrial, ovarian, and stomach cancers.  Other cancers that occur less commonly in Terry Syndrome include urinary tract, skin, and brain cancers.  The table below lists the chance that a person with Terry syndrome would develop cancer in his or her lifetime1.      Lifetime Cancer Risks    General Population Terry Syndrome   Colon 4.5% 10-82%   Endometrial 2.7% 15-60%   Stomach <1% 1-13%   Ovarian 1.6% Up to 24%     Inheriting a mutation does not mean a person will develop cancer, but it does significantly increase his or her risk above the general population risk.    Inheritance   Terry Syndrome  mutations are inherited in an autosomal dominant pattern.  This means that if a parent has a mutation, each of his or her children will have a 50% chance of inheriting that same mutation.  Therefore, each child--male or female--would have a 50% chance of being at increased risk for developing cancer.          Image obtained from [employer]etics Home Reference, 2013     Tumor Screening for Terry Syndrome  There are two different tests that can be done on a person s colon or endometrial tumor as an initial screen for Terry Syndrome. These tests are called IHC (immunohistochemistry) and MSI (microsatellite instability). Tumors that show an absent protein on IHC or an unstable MSI result could be due to a mutation in one of the known Terry Syndrome genes. The IHC results can also guide further genetic testing for Terry Syndrome by indicating which gene should be tested.     Genetic Testing  Genetic testing involves a blood test and will look at the genetic information in the Terry Syndrome genes for any harmful mutations that are associated with increased cancer risk.  If possible, it is recommended that the person(s) who has had cancer be tested first before other family members.  That person will give us the most useful information about whether or not a specific gene is associated with the cancer in the family.    Results  There are three possible results of Terry Syndrome genetic testing:    Positive--a harmful mutation was identified     Negative--no mutation was identified     Variant of unknown significance--a variation in one of the genes was identified, but it is unclear how this impacts cancer risk in the family    Advantages and Disadvantages  There are advantages and disadvantages to genetic testing of these genes.    Advantages    May clarify your cancer risk    Can help you make medical decisions    May explain the cancers in your family    May give useful information to your family members (if you share your  results)    Disadvantages    Possible negative emotional impact of learning about inherited cancer risk    Uncertainty in interpreting a negative test result in some situations    Possible genetic discrimination concerns (see below)    Genetic Information Nondiscrimination Act (REESE)  REESE is a federal law that protects individuals from health insurance or employment discrimination based on a genetic test result alone.  Although rare, there are currently no legal protections in terms of life insurance, long term care, or disability insurances.  Visit the National Human Genome Research [place] genome.gov/42256116 to learn more.    Reducing Cancer Risk  Current screening recommendations for people with a Terry Syndrome mutation include1:    Colorectal: Colonoscopy beginning at age 20-25 or 2-5 years before earliest diagnosis of colorectal cancer in the family; repeated every 1-2 years depending on family history    Endometrial/Ovarian:   o Consider surgery to remove uterus, ovaries, and fallopian tubes after child bearing  o Talk to your doctor about possible endometrial biopsy, transvaginal ultrasound, and CA-125 blood test screening for endometrial and ovarian cancer. There are limitations to this type of screening.    Stomach: Consider upper endoscopy (EGD with extended duodenoscopy) beginning at age 30-35; repeated every 3-5 years. This recommendation is individualized based on family history.     Urinary Tract: Consider annual urinalysis starting at 30-35. This recommendation is individualized based on family history, but should be considered in those who have a mutation in MSH2 (especially males).     Brain: Annual physical examination beginning at age 25-30     Depending upon the mutation in the family, dermatology evaluation or prostate screening may be recommended.  Early detection and prevention are primary goals of screening for colorectal cancer.  These recommendations may change based on the specific  gene mutation in the family.     Questions to Think About Regarding Genetic Testing    What effect will the test result have on me and my relationship with my family members if I have an inherited gene mutation?  If I don t have a gene mutation?    Should I share my test results, and how will my family react to this news, which may also affect them?    Are my children ready to learn new information that may one day affect their own health?    Resources  Terry Syndrome International lynchcancers.com   Terry Syndrome Screening Network lynchscreening.net   American Cancer Society (ACS) cancer.org   National Cancer [place] (NCI) cancer.gov     Please call us if you have any questions or concerns.    Cancer Risk Management Program [phone] ([phone])  ? Bella Self, MS, [facility] [place]  [phone]  ? Betzaida Rouse, MS, [facility] [place]  [phone]  ? Hannah Berger, MS, [facility] [place]  [phone]  ? Ghislaine Taylor, MS, [facility] [place]  [phone]  ? Yue Kirkland, MS, [facility] [place]  [phone]    References  15. National Comprehensive Cancer Network. Clinical practice guidelines in oncology, colorectal cancer screening. Available online (registration required). 2013.

## 2017-08-04 NOTE — LETTER
8/4/2017       RE: Anibal Storey  [address] [place] [address]     Dear Colleague,    Thank you for referring your patient, Anibal Storey, to the [facility]. Please see a copy of my visit note below.    8/4/2017    Referring Provider: Roque Almaguer MD     Presenting Information:   I met with Anibal Storey today for genetic counseling at the Cancer [facility] Management Program at the [facility] to discuss her family history of colon, breast, and other cancers.  She is here today with her  to review this history, cancer screening recommendations, and available genetic testing options.    Personal History:  Anibal is a 66 year old female.  She does not have any personal history of cancer.      She had her first menstrual period at age 13, her first child at age 28, and is post-menopausal (menopause started at 49).  Anibal has her ovaries, fallopian tubes and uterus in place, and she has had no ovarian cancer screening to date.  She reports she has not used hormone replacement therapy.      She reports having annual mammograms. Her last mammogram was in 7/12/2017 and was benign, as was a left breast ultrasound due to breast pain. Anibal does report a history of a breast biopsy of a benign breast growth that was golf ball sized and cone-shaped. This was in 1985.  Anibal's most recent colonoscopy in 2017, and three 3-4 millimeter polyps were found in the ascending colon. These were tubular adenomas. She has had three other polyps, two in 2016 (sessile serrated adenomas) and one in 2006.      Anibal also had pharmacogenetic testing with one of her other providers. We discussed that this is not my area of expertise, but that some genes can cause some individuals to metabolize (or process) certain drugs better than others.  We discussed that she should discuss the implications of these results with the physician that ordered them for her.  Her other physicians should also be aware of  "these results, as they may use this information in their decision-making regarding prescribing certain medications.    Family History: (Please see scanned pedigree for detailed family history information)    Two sons, ages 38 and 36. The older son gets colonoscopies for his colitis, but Anibal does not think he has had polyps.     Sister, age 72, had breast cancer in her mid 30s and had a mastectomy.    Sister, age 76, had colon cancer at 48, uterine cancer in her 50s, and bladder cancer in her 60s.  The bladder cancer was thought to be due to radiation treatment from her other cancers.     Four sisters with no cancers.  Anibal does not know if they have had polyps. She thinks one sister had genetic testing, but her sister does not know the results. She said that her sister's , a gastroenterologist, ordered the testing, but Anibal does not know the results of this, but remembers that her sister was \"high risk\" for something on this test. We discussed the importance of obtaining a copy of this test report to help interpret Anibal's testing. She will try to get a copy, but this may not be possible.    Mother  of metastatic cancer of unknown primary in her 90s.    Maternal uncle with colon cancer in his 60s or 70s.    Father  of complications of surgery due to a colon cancer diagnosis at 61.    No other known family history of cancer.     Her maternal ethnicity is Indian. Her paternal ethnicity is Indian.  There is no known Ashkenazi Anabaptist ancestry on either side of her family. There is no reported consanguinity.    Discussion:    Anibal's family history of cancer is suggestive of a hereditary cancer syndrome.    We reviewed the features of sporadic, familial, and hereditary cancers.  In looking at Anibal's family history, it is possible that a cancer susceptibility gene is present due to early-onset breast cancer in her sister, her sister with three primary cancers (uterine, colon, and bladder), and " family history of polyps and colon cancer.    We discussed her sister's early-onset breast cancer, and that it may have a hereditary cause. Mutations in either BRCA1 or BRCA2 cause Hereditary Breast and Ovarian Cancer syndrome (HBOC).  Women with HBOC have an increased risk for breast and ovarian cancer.  There is also an increased risk for prostate and breast cancer among males with a BRCA1/BRCA2 mutation.  Males and females with BRCA1/BRCA2 mutations also face a slightly increased risk for pancreatic cancer.      We also discussed her sister with three primary cancers (uterine, colon, and bladder), which are all part of the Terry syndrome spectrum. Terry syndrome can be caused by a mutation in one of five genes:  MLH1, MSH2, MSH6, PMS2, and EPCAM.  Terry syndrome may present with polyps, but typically a small number.  The highest cancer risks associated with Terry syndrome include colon cancer, endometrial/uterine cancer, gastric cancer, and ovarian cancer.    We discussed that genetic testing for colon and breast cancer susceptibility genes is typically most informative when it is first performed on a family member with a personal history of these cancers.  In this situation, we discussed that Anibal's sister with three cancers and sister with early-onset breast cancer would be the best people to test first.  Testing is available to Anibal, but with limitations.  If Anibal pursues testing at this time and receives a negative result, this does not rule out the possibility of a hereditary cancer syndrome in her and/or her family.  Anibal reports that her sisters would probably not be interested in this type of testing, because they would not want to know this information.  She does not think that they would want to know that they could be at risk of something.    Based on her personal and family history, Anibal meets current National Comprehensive Cancer Network (NCCN) criteria for genetic testing of BRCA1 and BRCA2.   Anibal also meets criteria for Terry syndrome testing, as her PREMM5 score is 4.9%.  PREMM5 is a tool used to determine who is a good candidate for genetic testing for Terry syndrome based on personal and family history of cancer. This model supports consideration of genetic testing for individuals who have a score > 2.5% (J Clin Oncol. 2017 May 10:[id number], PMID: 22593855).     We discussed that there are additional genes that could cause increased risk of breast and colon cancer.  As many of these genes present with overlapping features in a family, it would be reasonable for Anibal to consider panel genetic testing to analyze multiple genes at once.    Genetic testing is available for 34 genes associated with hereditary cancer: CancerNext (APC, DOMINIK, BARD1, BRCA1, BRCA2, BRIP1, BMPR1A, CDH1, CDK4, CDKN2A, CHEK2, DICER1, EPCAM, GREM1, HOXB13, MLH1, MRE11A, MSH2, MSH6, MUTYH, NBN, NF1, PALB2, PMS2, POLD1, POLE, PTEN, RAD50, RAD51C, RAD51D, SMAD4, SMARCA4, STK11, and TP53).    Many of the genes in the CancerNext panel have known risks and published management guidelines.  Some genes are associated with specific hereditary cancer syndromes: Hereditary Breast and Ovarian Cancer syndrome (BRCA1, BRCA2), Terry syndrome (MLH1, MSH2, MSH6, PMS2, EPCAM), Familial Adenomatous Polyposis syndrome (APC), Hereditary Gastric Cancer syndrome (CDH1), Familial Atypical Multiple Mole Melanoma syndrome (CDK4, CDKN2A), Juvenile Polyposis syndrome (BMPR1A, SMAD4), Cowden syndrome (PTEN), Li Fraumeni syndrome (TP53), Peutz-Jeghers syndrome (STK11), MUTYH Associated Polyposis syndrome (MUTYH), and Neurofibromatosis type 1 (NF1).     The DOMINIK, BRIP1, CHEK2, GREM1, NBN , PALB2, POLD1, POLE, RAD51C, and RAD51D genes are associated with increased cancer risk and have published management guidelines for certain cancers.      The remaining genes (BARD1, DICER1, HOXB13, MRE11A, RAD50, and SMARCA4) are associated with increased cancer risk and  may allow us to make medical recommendations when mutations are identified.      Consent was obtained and genetic testing for CancerNext was sent to [facility]. Turn around time: 5 weeks.      A detailed handout regarding breast cancer risk genes, Terry syndrome, and the information we discussed was provided to Anibal at the end of our appointment today and can be found in the after visit summary.  Topics included: inheritance pattern, cancer risks, cancer screening recommendations, and also risks, benefits and limitations of testing.  A list of genes from the CancerNext test from [facility] was also included.     Medical Management: The information from genetic testing may determine additional cancer screening recommendations (i.e. mammogram and breast MRI, more frequent colonoscopies, annual dermatologic exams, etc.) as well as options for risk reducing surgeries (i.e. bilateral mastectomy, surgery to remove the ovaries and/or uterus, etc.) for Anibal and her relatives.  These recommendations will be discussed in detail once genetic testing is completed.    Plan:  1) Today Anibal elected to proceed with CancerNext.  2) This information should be available in 4-5 weeks.   3) Anibal will return to clinic to discuss the results. A return appointment was scheduled for 9/15 at 10:15 am.    Face to face time: 45 minutes    Betzaida Rouse MS, [facility]  Certified Genetic Counselor  P. [phone]  F. [phone]      Again, thank you for allowing me to participate in the care of your patient.      Sincerely,    Betzaida Rouse GC

## 2017-08-04 NOTE — MR AVS SNAPSHOT
After Visit Summary   8/4/2017    Anibal Storey    MRN: [id number]           Patient Information     Date Of Birth          1950        Visit Information        Provider Department      8/4/2017 8:00 AM Betzaida Rouse GC;  2 114 CONSULT [facility]        Today's Diagnoses     Family history of malignant neoplasm of breast    -  1    Family history of colon cancer        Family history of uterine cancer        Family history of bladder cancer          Care Instructions      Assessing Cancer Risk  Only about 5-10% of cancers are thought to be due to an inherited cancer susceptibility gene.    These families often have:    Several people with the same or related types of cancer    Cancers diagnosed at a young age (before age 50)    Individuals with more than one primary cancer    Multiple generations of the family affected with cancer    Some people may be candidates for genetic testing of more than one gene.  For these families, genetic testing using a multi-gene cancer panel may be offered.  These panels may test genes known to increase the risk for breast (and other) cancers: DOMINIK, BRCA1, BRCA2, CDH1, CHEK2, PALB2, PTEN, and TP53.  The purpose of this handout is to serve as a brief summary of the high/moderate-risk breast cancer genes which have published clinical management guidelines for individuals who are found to carry a mutation.    Hereditary Breast and Ovarian Cancer Syndrome (HBOC)  A single mutation in one of the copies of BRCA1 or BRCA2 increases the risk for breast and ovarian cancer, among others.  The risk for pancreatic cancer and melanoma may also be slightly increased in some families.  The tables below list the chance that someone with a BRCA mutation would develop cancer in his or her lifetime1,2,3,4.          Women   Men     General Population BRCA+    General Population BRCA+   Breast  12% 40-80%  Breast <1% ~7%   Ovarian  1-2% 10-40%  Prostate 16% 20%      A person s ethnic background is also important to consider, as individuals of Ashkenazi Sikhism ancestry have a higher chance of having a BRCA gene mutation.  There are three BRCA mutations that occur more frequently in this population.    Li-Fraumeni Syndrome (LFS)  LFS is a cancer predisposition syndrome. Individuals with LFS are at an increased risk for developing cancer at a young age. The general lifetime risk for development of cancer is 50% by age 30 and 90% by age 60.  LFS is caused by a mutation in the TP53 gene.  A single mutation in one of the copies of TP53 increases the risk for multiple cancers.     Core Cancers: Sarcomas, Breast, Brain, Lung, Leukemias/Lymphomas, Adrenocortical carcinomas  Other Cancers: Gastrointestinal, Thyroid, Skin, Genitourinary        Cowden Syndrome  Cowden syndrome is a hereditary condition that increases the risk for breast, thyroid, endometrial, and kidney cancer.  Cowden syndrome is caused by a mutation in the PTEN gene.  A single mutation in one of the copies of PTEN causes Cowden syndrome and increases cancer risk.  The table below shows the chance that someone with a PTEN mutation would develop cancer in their lifetime5,6.  Other benign features seen in some individuals with Cowden syndrome include benign skin lesions (facial papules, keratoses, lipomas), learning disability, autism, thyroid nodules, colon polyps, and larger head size.      Lifetime Cancer Risk   Cancer Type General Population Cowden Syndrome   Breast  12% 25-50%*   Thyroid  1% 35%   Renal 1-2% 35%   Endometrial  2-3% 28%   Colon 5% 9%   Melanoma 2-3% >5%     *One recent study found breast cancer risk to be increased to 85%    Hereditary Diffuse Gastric Cancer (HDGC)  Currently, one gene is known to cause hereditary diffuse gastric cancer: CDH1.  Individuals with HDGC are at increased risk for diffuse gastric cancer and lobular breast cancer. Of people diagnosed with HDGC, 30-50% have a mutation in  the CDH1 gene.  This suggests there are likely other genes that may cause HDGC that have not been identified yet.      Lifetime Cancer Risks    General Pop HDGC    Diffuse Gastric  <1% ~80%   Breast 12% 39-52%     Additional Genes Associated with Increased Breast Cancer Risk  PALB2  Mutations in PALB2 have been shown to increase the risk of breast cancer up to 33-58% in some families; where individuals fall within this risk range is dependent upon family history.9 PALB2 mutations have also been associated with increased risk for pancreatic cancer, although this risk has not been quantified yet.  Individuals who inherit two PALB2 mutations--one from their mother and one from their father--have a condition called Fanconi Anemia.  This rare autosomal recessive condition is associated with short stature, developmental delay, bone marrow failure, and increased risk for childhood cancers.    DOMINIK  DOMINIK is a moderate-risk breast cancer gene. Women who have a mutation in DOMINIK can have between a 2-4 fold increased risk for breast cancer compared to the general population.10  DOMINIK mutations have also been associated with increased risk for pancreatic cancer, however an estimate of this cancer risk is not well understood.11  Individuals who inherit two DOMINIK mutations have a condition called ataxia-telangiectasia (AT).  This rare autosomal recessive condition affects the nervous system and immune system, and is associated with progressive cerebellar ataxia beginning in childhood.  Individuals with ataxia-telangiectasia often have a weakened immune system and have an increased risk for childhood cancers.           CHEK2   CHEK2 is a moderate-risk breast cancer gene.  Women who have a mutation in CHEK2 have around a 2-fold increased risk for breast cancer compared to the general population, and this risk may be higher depending upon family history.12,13,14  Mutations in CHEK2 have also been shown to increase the risk of a number of  other cancers, including colon and prostate, however these cancer risks are currently not well understood.         Inheritance   All of the genes reviewed above are inherited in an autosomal dominant pattern.  This means that if a parent has a mutation, each of his or her children will have a 50% chance of inheriting that same mutation.  Therefore, each child--male or female--would have a 50% chance of being at increased risk for developing cancer.      Image obtained from Genetics Home Reference, 2013     Mutations in some genes can occur de venkata, which means that a person s mutation occurred for the first time in them and was not inherited from a parent.  Now that they have the mutation, however, it can be passed on to future generations.    Genetic Testing  Genetic testing involves a blood test and will look for any harmful mutations within genes that are associated with increased cancer risk.  If possible, it is recommended that the person(s) who has had cancer be tested before other family members.  That person will give us the most useful information about whether or not a specific gene is associated with the cancer in the family.    Results  There are three possible results of genetic testing:    Positive--a harmful mutation was identified in one or more of the genes    Negative--no mutation was identified in any of the genes on this panel    Variant of unknown significance--a variation in one of the genes was identified, but it is unclear how this impacts cancer risk in the family    Advantages and Disadvantages  There are advantages and disadvantages to genetic testing.  Advantages    May clarify your cancer risk    Can help you make medical decisions    May explain the cancers in your family    May give useful information to your family members (if you share your results)    Disadvantages    Possible negative emotional impact of learning about inherited cancer risk    Uncertainty in interpreting a negative  test result in some situations    Possible genetic discrimination concerns (see below)    Genetic Information Nondiscrimination Act (REESE)  REESE is a federal law that protects individuals from health insurance or employment discrimination based on a genetic test result alone.  Although rare, there are currently no legal discrimination protections in terms of life insurance, long term care, or disability insurances.  Visit the National Human Genome Research [place] genome.gov/90489888 to learn more.    Reducing Cancer Risk  Each of the genes listed within this handout have nationally recognized cancer screening guidelines that would be recommended for individuals who test positive.  In addition to increased cancer screening, surgeries may be offered or recommended to reduce cancer risk.  Recommendations are based upon an individual s genetic test result as well as their personal and family history of cancer.    Questions to Think About Regarding Genetic Testing    What effect will the test result have on me and my relationship with my family members if I have an inherited gene mutation?  If I don t have a gene mutation?    Should I share my test results, and how will my family react to this news, which may also affect them?    Are my children ready to learn new information that may one day affect their own health?      Resources  FORCE: Facing Our Risk of Cancer Empowered facingourrisk.org   Bright Pink bebrightpink.org   Li-Fraumeni Syndrome Association lfsassociation.org   PTEN World PTENworld.ReserveOut   No stomach for cancer, Inc. nostomachforcancer.org   Stomach cancer relief network scrnet.org   Collaborative Group of the Americas on Inherited Colorectal Cancer (CGA) cgaicc.com    Cancer Care cancercare.org   American Cancer Society (ACS) cancer.org   National Cancer [place] (NCI) cancer.gov     Cancer Risk Management Program [phone]-[facility]-CANCER ([phone])  ? Bella Self, MS, [facility]  [phone]  ? Betzaida Rouse,  MS, [facility]  [phone]  ? Hannah Berger, MS, [facility]  [phone]  ? Ghislaine Taylor, MS, [facility]  [phone]   ? Yue Kirkland, MS, [facility]  [phone]    References  1. Unruly MartínezP, Yi S, Risch GIBSON, Geovanni JE, Clifton JL, Amber N, Alyse H, Tina O, Dayan A, Landy B, Deandre P, Manangel S, Corazon DM, Escamilla N, Wilmer E, Neo H, Jaun E, Bella J, Tamra J, Javier B, Ever H, Thorlaci S, Eerola H, Milton H, Cathryn K, Amish OP. Average risks of breast and ovarian cancer associated with BRCA1 or BRCA2 mutations detected in case series unselected for family history: a combined analysis of 222 studies. Am J Hum Tala. 2003;72:1117-30.  2. Antonina N, Cecilia M, Pricila G.  BRCA1 and BRCA2 Hereditary Breast and Ovarian Cancer. Gene Reviews online. 2013.  3. Rex YC, Manuelk S, Yareli G, Duran S. Breast cancer risk among male BRCA1 and BRCA2 mutation carriers. J Natl Cancer Inst. 2007;99:1811-4.  4. Patrick DG, Lopez I, Eddie J, Tevin E, Ian ER, Laarchel F. Risk of breast cancer in male BRCA2 carriers. J Med Tala. 2010;47:710-1.  5. Xiang MH, Tin J, Anne J, Jean-Pierre BECERRA, Edwige MS, Eng C. Lifetime cancer risks in individuals with germline PTEN mutations. Clin Cancer Res. 2012;18:400-7.  6. Pilnasim R. Cowden Syndrome: A Critical Review of the Clinical Literature. J Tala . 2009:18:13-27.  7. National Comprehensive Cancer Network. Clinical practice guidelines in oncology, colorectal cancer screening. Available online (registration required). 2013.  8. National Cancer [place]. SEER Cancer Stat Fact Sheets.  December 2013.  9. Edna CORONADO, et al. Breast-Cancer Risk in Families with Mutations in PALB2. NEJM. 2014; 371(6):497-506.  10. Nasim GARZA, Juan D, Reba S, Leonor P, Rosita T, Angel M, Lucas B, Elizabeth H, Nell R, Kassandra K, Miguel Angel L, Patrick MACHUCA, Corazon D, Giorgio DF, Rhona MR, The Breast Cancer Susceptibility Collaboration (UK) & Bartolome POLANCO. DOMINIK mutations that  cause ataxia-telangiectasia are breast cancer susceptibility alleles. Nature Genetics. 2006;38:873-875  11. Oracio N , Nik Y, Jessie J, Niurka L, Haile GM , Ganga ML, Sia S, Muniz AG, Po S, Christiane ML, Yumiko J , Anita R, Hector SZ, Tano JR, Betzaida VE, Eder M, Vogelstein B, Ronel N, Paco RH, Franck KW, and Jered AP. DOMINIK mutations in patients with hereditary pancreatic cancer. Cancer Discover. 2012;2:41-46  12. CHEK2 Breast Cancer Case-Control Consortium. CHEK2*1100delC and susceptibility to breast cancer: A collaborative analysis involving 10,860 breast cancer cases and 9,065 controls from 10 studies. Am J Hum Tala, 74 (2004), pp. 4250-0445  13. Elizabeth T, Tommy S, Willis K, et al. Spectrum of Mutations in BRCA1, BRCA2, CHEK2, and TP53 in Families at High Risk of Breast Cancer. TOBY. 2006;295(12):2071-4406.   14. Eric C, Kamilah D, Anya A, et al. Risk of breast cancer in women with a CHEK2 mutation with and without a family history of breast cancer. J Clin Oncol. 2011;29:9992-4618        Assessing Cancer Risk  Only about 5-10% of cancers are thought to be due to an inherited cancer susceptibility gene.    These families often have:    Several people with the same or related types of cancer    Cancers diagnosed at a young age (before age 50)    Individuals with more than one primary cancer    Multiple generations of the family affected with cancer    Terry Syndrome Genes  We each inherit two copies of every gene in our bodies: one from our mother and one from our father.  Each gene has a specific job to do.  When a gene has a mistake or  mutation  in it, it does not work like it should. Currently five genes are known to cause Terry Syndrome: MLH1, MSH2, MSH6, PMS2, and EPCAM.  A single mutation in one of the Terry Syndrome genes increases the risk for colon, endometrial, ovarian, and stomach cancers.  Other cancers that occur less commonly in Terry Syndrome include  urinary tract, skin, and brain cancers.  The table below lists the chance that a person with Terry syndrome would develop cancer in his or her lifetime1.      Lifetime Cancer Risks    General Population Terry Syndrome   Colon 4.5% 10-82%   Endometrial 2.7% 15-60%   Stomach <1% 1-13%   Ovarian 1.6% Up to 24%     Inheriting a mutation does not mean a person will develop cancer, but it does significantly increase his or her risk above the general population risk.    Inheritance   Terry Syndrome mutations are inherited in an autosomal dominant pattern.  This means that if a parent has a mutation, each of his or her children will have a 50% chance of inheriting that same mutation.  Therefore, each child--male or female--would have a 50% chance of being at increased risk for developing cancer.          Image obtained from Genetics Home Reference, 2013     Tumor Screening for Terry Syndrome  There are two different tests that can be done on a person s colon or endometrial tumor as an initial screen for Terry Syndrome. These tests are called IHC (immunohistochemistry) and MSI (microsatellite instability). Tumors that show an absent protein on IHC or an unstable MSI result could be due to a mutation in one of the known Terry Syndrome genes. The IHC results can also guide further genetic testing for Terry Syndrome by indicating which gene should be tested.     Genetic Testing  Genetic testing involves a blood test and will look at the genetic information in the Terry Syndrome genes for any harmful mutations that are associated with increased cancer risk.  If possible, it is recommended that the person(s) who has had cancer be tested first before other family members.  That person will give us the most useful information about whether or not a specific gene is associated with the cancer in the family.    Results  There are three possible results of Terry Syndrome genetic testing:    Positive--a harmful mutation was identified      Negative--no mutation was identified     Variant of unknown significance--a variation in one of the genes was identified, but it is unclear how this impacts cancer risk in the family    Advantages and Disadvantages  There are advantages and disadvantages to genetic testing of these genes.    Advantages    May clarify your cancer risk    Can help you make medical decisions    May explain the cancers in your family    May give useful information to your family members (if you share your results)    Disadvantages    Possible negative emotional impact of learning about inherited cancer risk    Uncertainty in interpreting a negative test result in some situations    Possible genetic discrimination concerns (see below)    Genetic Information Nondiscrimination Act (REESE)  REESE is a federal law that protects individuals from health insurance or employment discrimination based on a genetic test result alone.  Although rare, there are currently no legal protections in terms of life insurance, long term care, or disability insurances.  Visit the National Human Genome Research [place] genome.gov/09565345 to learn more.    Reducing Cancer Risk  Current screening recommendations for people with a Terry Syndrome mutation include1:    Colorectal: Colonoscopy beginning at age 20-25 or 2-5 years before earliest diagnosis of colorectal cancer in the family; repeated every 1-2 years depending on family history    Endometrial/Ovarian:   o Consider surgery to remove uterus, ovaries, and fallopian tubes after child bearing  o Talk to your doctor about possible endometrial biopsy, transvaginal ultrasound, and CA-125 blood test screening for endometrial and ovarian cancer. There are limitations to this type of screening.    Stomach: Consider upper endoscopy (EGD with extended duodenoscopy) beginning at age 30-35; repeated every 3-5 years. This recommendation is individualized based on family history.     Urinary Tract: Consider annual  urinalysis starting at 30-35. This recommendation is individualized based on family history, but should be considered in those who have a mutation in MSH2 (especially males).     Brain: Annual physical examination beginning at age 25-30     Depending upon the mutation in the family, dermatology evaluation or prostate screening may be recommended.  Early detection and prevention are primary goals of screening for colorectal cancer.  These recommendations may change based on the specific gene mutation in the family.     Questions to Think About Regarding Genetic Testing    What effect will the test result have on me and my relationship with my family members if I have an inherited gene mutation?  If I don t have a gene mutation?    Should I share my test results, and how will my family react to this news, which may also affect them?    Are my children ready to learn new information that may one day affect their own health?    Resources  Terry Syndrome International lynchcancers.[employer]   Terry Syndrome Screening Network lynchscreening.net   American Cancer Society (ACS) cancer.org   National Cancer [place] (NCI) cancer.gov     Please call us if you have any questions or concerns.    Cancer Risk Management Program [phone]-[facility]ANC[facility] ([phone])  ? Bella Self, MS, [facility]  [phone]  ? Betzaidatorres Rouse, MS, [facility]  [phone]  ? Hannah Berger, MS, [facility]  [phone]  ? Ghislaine Taylor, MS, [facility]  [phone]  ? Yue Kirkland, MS, [facility]  [phone]    References  15. National Comprehensive Cancer Network. Clinical practice guidelines in oncology, colorectal cancer screening. Available online (registration required). 2013.                  Follow-ups after your visit        Your next 10 appointments already scheduled     Aug 04, 2017  9:15 AM CDT   (Arrive by 8:15 AM)   [facility] with  [facility]   [facility] ([facility])    [address]  [address] [facility]  [address]   [phone]            Aug 10, 2017  9:15 AM CDT   RETURN GENERAL with Morgan Munguia MD   [facility] Clinic ([facility])    [address]  [place] [address]   [phone]              Future tests that were ordered for you today     Open Future Orders        Priority Expected Expires Ordered    [facility]cer[facility] - Amb[facility] Test: Laboratory Miscellaneous Order Routine  8/4/2018 8/4/2017            Who to contact     If you have questions or need follow up information about today's clinic visit or your schedule please contact [facility] directly at [phone].  Normal or non-critical lab and imaging results will be communicated to you by FIGHTER Interactivehart, letter or phone within 4 business days after the clinic has received the results. If you do not hear from us within 7 days, please contact the clinic through Foap ABt or phone. If you have a critical or abnormal lab result, we will notify you by phone as soon as possible.  Submit refill requests through Eventfinda or call your pharmacy and they will forward the refill request to us. Please allow 3 business days for your refill to be completed.          Additional Information About Your Visit        Eventfinda Information     Event[employer]a gives you secure access to your electronic health record. If you see a primary care provider, you can also send messages to your care team and make appointments. If you have questions, please call your primary care clinic.  If you do not have a primary care provider, please call [phone] and they will assist you.        Care EveryWhere ID     This is your Care EveryWhere ID. This could be used by other organizations to access your [facility] medical records  [id number]         Blood Pressure from Last 3 Encounters:   07/17/17 151/79   07/07/17 131/81   06/29/17 123/81    Weight from Last 3 Encounters:   07/07/17 61 kg (134 lb 6.4 oz)   06/29/17 60.4 kg (133 lb 3.2  oz)   06/28/17 59.9 kg (132 lb)               Primary Care Provider Office Phone # Fax #    Roque Almaguer [phone]       [facility] [address] [place] [address]        Equal Access to Services     COURTNEY FRANCIS : Hadii bertrand ku hadasho Soomaali, waaxda luqadaha, qaybta kaalmada adeegyada, waxay feliciain meeran kenzie foleyleeleemarimar macdonald. So [facility] [phone].    ATENCIÓN: Si habla español, tiene a mata disposición servicios gratuitos de asistencia lingüística. Angeliqueame al [phone].    We comply with applicable federal civil rights laws and [place] laws. We do not discriminate on the basis of race, color, national origin, age, disability sex, sexual orientation or gender identity.            Thank you!     Thank you for choosing [facility]  for your care. Our goal is always to provide you with excellent care. Hearing back from our patients is one way we can continue to improve our services. Please take a few minutes to complete the written survey that you may receive in the mail after your visit with us. Thank you!             Your Updated Medication List - Protect others around you: Learn how to safely use, store and throw away your medicines at www.disposemymeds.org.          This list is accurate as of: 8/4/17  8:57 AM.  Always use your most recent med list.                   Brand Name Dispense Instructions for use Diagnosis    ALEVE PO      Take 220 mg by mouth daily as needed for moderate pain        aspirin 81 MG EC tablet     90 tablet    Take 1 tablet (81 mg) by mouth daily    Chest pain at rest, Hyperlipidemia LDL goal <100, Coronary artery disease involving native coronary artery of native heart without angina pectoris       calcium carbonate 500 MG chewable tablet    TUMS     Take 1 chew tab by mouth as needed.        clobetasol 0.05 % ointment    TEMOVATE    15 g    Use on itchy areas on hands and feet for up to 2 weeks    Dyshidrotic eczema        CRESTOR 5 MG tablet   Generic drug:  rosuvastatin     90 tablet    Take 1 tablet (5 mg) by mouth daily    Hyperlipidemia LDL goal <100, Chest pain at rest, Coronary artery disease involving native coronary artery of native heart without angina pectoris       metroNIDAZOLE 0.75 % cream    METROCREAM    45 g    Apply topically 2 times daily To affected areas on the face    Rosacea       NATURAL BALANCE OP      Apply to eye daily Preservative free both eyes.        nitroGLYcerin 0.4 MG sublingual tablet    NITROSTAT    30 tablet    Place 1 tablet (0.4 mg) under the tongue every 5 minutes as needed for chest pain    Chest pain at rest, Hyperlipidemia LDL goal <100, Coronary artery disease involving native coronary artery of native heart without angina pectoris       TYLENOL PO      Take 500 mg by mouth 2 times daily as needed.        VITAMIN C PO      Take 500 mg by mouth as needed. Pt states she takes this when she gets a cold        VITAMIN D (CHOLECALCIFEROL) PO      Take 1,000 Units by mouth daily Every other day

## 2017-08-04 NOTE — NURSING NOTE
Chief Complaint   Patient presents with     Blood Draw     left arm draw, lab appointment only no need for vitals      Ignacia Hoyt, CMA

## 2017-08-04 NOTE — LETTER
Cancer Risk Management  Program Locations    [facility]  [facility]  Mailing Address  Cancer Risk Management Program  [facility]  [address]  [place] [address]    New patient appointments  [phone]  August 4, 2017    Anibal Araujo[address] [place] [address]      Dear Anibal,    It was a pleasure meeting with you at the [facility] on 8/4/2017.  Here is a copy of the progress note from your recent genetic counseling visit to the Cancer Risk Management Program.  If you have any additional questions, please feel free to call.    Referring Provider: Roque Almaguer MD     Presenting Information:   I met with Anibal Storey today for genetic counseling at the Cancer Risk Management Program at the [facility] to discuss her family history of colon, breast, and other cancers.  She is here today with her  to review this history, cancer screening recommendations, and available genetic testing options.    Personal History:  Anibal is a 66 year old female.  She does not have any personal history of cancer.      She had her first menstrual period at age 13, her first child at age 28, and is post-menopausal (menopause started at 49).  Anibal has her ovaries, fallopian tubes and uterus in place, and she has had no ovarian cancer screening to date.  She reports she has not used hormone replacement therapy.      She reports having annual mammograms. Her last mammogram was in 7/12/2017 and was benign, as was a left breast ultrasound due to breast pain. Anibal does report a history of a breast biopsy of a benign breast growth that was golf ball sized and cone-shaped. This was in 1985.  Anibal's most recent colonoscopy in 2017, and three 3-4 millimeter polyps were found in the ascending colon.  "These were tubular adenomas. She has had three other polyps, two in 2016 (sessile serrated adenomas) and one in 2006.      Anibal also had pharmacogenetic testing with one of her other providers. We discussed that this is not my area of expertise, but that some genes can cause some individuals to metabolize (or process) certain drugs better than others.  We discussed that she should discuss the implications of these results with the physician that ordered them for her.  Her other physicians should also be aware of these results, as they may use this information in their decision-making regarding prescribing certain medications.    Family History: (Please see scanned pedigree for detailed family history information)    Two sons, ages 38 and 36. The older son gets colonoscopies for his colitis, but Anibal does not think he has had polyps.     Sister, age 72, had breast cancer in her mid 30s and had a mastectomy.    Sister, age 76, had colon cancer at 48, uterine cancer in her 50s, and bladder cancer in her 60s.  The bladder cancer was thought to be due to radiation treatment from her other cancers.     Four sisters with no cancers.  Anibal does not know if they have had polyps. She thinks one sister had genetic testing, but her sister does not know the results. She said that her sister's , a gastroenterologist, ordered the testing, but Anibal does not know the results of this, but remembers that her sister was \"high risk\" for something on this test. We discussed the importance of obtaining a copy of this test report to help interpret Anibal's testing. She will try to get a copy, but this may not be possible.    Mother  of metastatic cancer of unknown primary in her 90s.    Maternal uncle with colon cancer in his 60s or 70s.    Father  of complications of surgery due to a colon cancer diagnosis at 61.    No other known family history of cancer.     Her maternal ethnicity is [place]. Her paternal ethnicity " is [place].  There is no known Ashkenazi Episcopalian ancestry on either side of her family. There is no reported consanguinity.    Discussion:    Anibal's family history of cancer is suggestive of a hereditary cancer syndrome.    We reviewed the features of sporadic, familial, and hereditary cancers.  In looking at Anibal's family history, it is possible that a cancer susceptibility gene is present due to early-onset breast cancer in her sister, her sister with three primary cancers (uterine, colon, and bladder), and family history of polyps and colon cancer.    We discussed her sister's early-onset breast cancer, and that it may have a hereditary cause. Mutations in either BRCA1 or BRCA2 cause Hereditary Breast and Ovarian Cancer syndrome (HBOC).  Women with HBOC have an increased risk for breast and ovarian cancer.  There is also an increased risk for prostate and breast cancer among males with a BRCA1/BRCA2 mutation.  Males and females with BRCA1/BRCA2 mutations also face a slightly increased risk for pancreatic cancer.      We also discussed her sister with three primary cancers (uterine, colon, and bladder), which are all part of the Terry syndrome spectrum. Terry syndrome can be caused by a mutation in one of five genes:  MLH1, MSH2, MSH6, PMS2, and EPCAM.  Terry syndrome may present with polyps, but typically a small number.  The highest cancer risks associated with Terry syndrome include colon cancer, endometrial/uterine cancer, gastric cancer, and ovarian cancer.    We discussed that genetic testing for colon and breast cancer susceptibility genes is typically most informative when it is first performed on a family member with a personal history of these cancers.  In this situation, we discussed that Anibal's sister with three cancers and sister with early-onset breast cancer would be the best people to test first.  Testing is available to Anibal, but with limitations.  If Anibal pursues testing at this time and  receives a negative result, this does not rule out the possibility of a hereditary cancer syndrome in her and/or her family.  Anibal reports that her sisters would probably not be interested in this type of testing, because they would not want to know this information.  She does not think that they would want to know that they could be at risk of something.    Based on her personal and family history, Anibal meets current National Comprehensive Cancer Network (NCCN) criteria for genetic testing of BRCA1 and BRCA2.  Anibal also meets criteria for Terry syndrome testing, as her PREMM5 score is 4.9%.  PREMM5 is a tool used to determine who is a good candidate for genetic testing for Terry syndrome based on personal and family history of cancer. This model supports consideration of genetic testing for individuals who have a score > 2.5% (J Clin Oncol. 2017 May 10:[id number], PMID: 64762148).     We discussed that there are additional genes that could cause increased risk of breast and colon cancer.  As many of these genes present with overlapping features in a family, it would be reasonable for Anibal to consider panel genetic testing to analyze multiple genes at once.    Genetic testing is available for 34 genes associated with hereditary cancer: CancerNext (APC, DOMINIK, BARD1, BRCA1, BRCA2, BRIP1, BMPR1A, CDH1, CDK4, CDKN2A, CHEK2, DICER1, EPCAM, GREM1, HOXB13, MLH1, MRE11A, MSH2, MSH6, MUTYH, NBN, NF1, PALB2, PMS2, POLD1, POLE, PTEN, RAD50, RAD51C, RAD51D, SMAD4, SMARCA4, STK11, and TP53).    Many of the genes in the CancerNext panel have known risks and published management guidelines.  Some genes are associated with specific hereditary cancer syndromes: Hereditary Breast and Ovarian Cancer syndrome (BRCA1, BRCA2), Terry syndrome (MLH1, MSH2, MSH6, PMS2, EPCAM), Familial Adenomatous Polyposis syndrome (APC), Hereditary Gastric Cancer syndrome (CDH1), Familial Atypical Multiple Mole Melanoma syndrome (CDK4, CDKN2A),  Juvenile Polyposis syndrome (BMPR1A, SMAD4), Cowden syndrome (PTEN), Li Fraumeni syndrome (TP53), Peutz-Jeghers syndrome (STK11), MUTYH Associated Polyposis syndrome (MUTYH), and Neurofibromatosis type 1 (NF1).     The DOMINIK, BRIP1, CHEK2, GREM1, NBN , PALB2, POLD1, POLE, RAD51C, and RAD51D genes are associated with increased cancer risk and have published management guidelines for certain cancers.      The remaining genes (BARD1, DICER1, HOXB13, MRE11A, RAD50, and SMARCA4) are associated with increased cancer risk and may allow us to make medical recommendations when mutations are identified.      Consent was obtained and genetic testing for CancerNext was sent to [facility]. Turn around time: 5 weeks.      A detailed handout regarding breast cancer risk genes, Terry syndrome, and the information we discussed was provided to Anibal at the end of our appointment today and can be found in the after visit summary.  Topics included: inheritance pattern, cancer risks, cancer screening recommendations, and also risks, benefits and limitations of testing.  A list of genes from the CancerNext test from [facility] was also included.     Medical Management: The information from genetic testing may determine additional cancer screening recommendations (i.e. mammogram and breast MRI, more frequent colonoscopies, annual dermatologic exams, etc.) as well as options for risk reducing surgeries (i.e. bilateral mastectomy, surgery to remove the ovaries and/or uterus, etc.) for Anibal and her relatives.  These recommendations will be discussed in detail once genetic testing is completed.    Plan:  1) Today Anibal elected to proceed with CancerNext.  2) This information should be available in 4-5 weeks.   3) Anibal will return to clinic to discuss the results. A return appointment was scheduled for 9/15 at 10:15 am.    Face to face time: 45 minutes    Betzaida Rouse MS, [facility]  Certified Genetic Counselor  P. [phone]  F.  [phone]

## 2017-08-04 NOTE — LETTER
8/4/2017      RE: Anibal Storey  [address] [place] [address]       8/4/2017    Referring Provider: Roque Almaguer MD     Presenting Information:   I met with Anibal Storey today for genetic counseling at the Cancer Risk Management Program at the [facility] to discuss her family history of colon, breast, and other cancers.  She is here today with her  to review this history, cancer screening recommendations, and available genetic testing options.    Personal History:  Anibal is a 66 year old female.  She does not have any personal history of cancer.      She had her first menstrual period at age 13, her first child at age 28, and is post-menopausal (menopause started at 49).  Anibal has her ovaries, fallopian tubes and uterus in place, and she has had no ovarian cancer screening to date.  She reports she has not used hormone replacement therapy.      She reports having annual mammograms. Her last mammogram was in 7/12/2017 and was benign, as was a left breast ultrasound due to breast pain. Anibal does report a history of a breast biopsy of a benign breast growth that was golf ball sized and cone-shaped. This was in 1985.  Anibal's most recent colonoscopy in 2017, and three 3-4 millimeter polyps were found in the ascending colon. These were tubular adenomas. She has had three other polyps, two in 2016 (sessile serrated adenomas) and one in 2006.      Anibal also had pharmacogenetic testing with one of her other providers. We discussed that this is not my area of expertise, but that some genes can cause some individuals to metabolize (or process) certain drugs better than others.  We discussed that she should discuss the implications of these results with the physician that ordered them for her.  Her other physicians should also be aware of these results, as they may use this information in their decision-making regarding prescribing certain medications.    Family History: (Please see scanned  "pedigree for detailed family history information)    Two sons, ages 38 and 36. The older son gets colonoscopies for his colitis, but Anbial does not think he has had polyps.     Sister, age 72, had breast cancer in her mid 30s and had a mastectomy.    Sister, age 76, had colon cancer at 48, uterine cancer in her 50s, and bladder cancer in her 60s.  The bladder cancer was thought to be due to radiation treatment from her other cancers.     Four sisters with no cancers.  Anibal does not know if they have had polyps. She thinks one sister had genetic testing, but her sister does not know the results. She said that her sister's , a gastroenterologist, ordered the testing, but Anibal does not know the results of this, but remembers that her sister was \"high risk\" for something on this test. We discussed the importance of obtaining a copy of this test report to help interpret Anibal's testing. She will try to get a copy, but this may not be possible.    Mother  of metastatic cancer of unknown primary in her 90s.    Maternal uncle with colon cancer in his 60s or 70s.    Father  of complications of surgery due to a colon cancer diagnosis at 61.    No other known family history of cancer.     Her maternal ethnicity is [place]. Her paternal ethnicity is [place].  There is no known Ashkenazi Methodist ancestry on either side of her family. There is no reported consanguinity.    Discussion:    Anibal's family history of cancer is suggestive of a hereditary cancer syndrome.    We reviewed the features of sporadic, familial, and hereditary cancers.  In looking at Anibal's family history, it is possible that a cancer susceptibility gene is present due to early-onset breast cancer in her sister, her sister with three primary cancers (uterine, colon, and bladder), and family history of polyps and colon cancer.    We discussed her sister's early-onset breast cancer, and that it may have a hereditary cause. Mutations in " either BRCA1 or BRCA2 cause Hereditary Breast and Ovarian Cancer syndrome (HBOC).  Women with HBOC have an increased risk for breast and ovarian cancer.  There is also an increased risk for prostate and breast cancer among males with a BRCA1/BRCA2 mutation.  Males and females with BRCA1/BRCA2 mutations also face a slightly increased risk for pancreatic cancer.      We also discussed her sister with three primary cancers (uterine, colon, and bladder), which are all part of the Terry syndrome spectrum. Teryr syndrome can be caused by a mutation in one of five genes:  MLH1, MSH2, MSH6, PMS2, and EPCAM.  Terry syndrome may present with polyps, but typically a small number.  The highest cancer risks associated with Terry syndrome include colon cancer, endometrial/uterine cancer, gastric cancer, and ovarian cancer.    We discussed that genetic testing for colon and breast cancer susceptibility genes is typically most informative when it is first performed on a family member with a personal history of these cancers.  In this situation, we discussed that Anibal's sister with three cancers and sister with early-onset breast cancer would be the best people to test first.  Testing is available to Anibal, but with limitations.  If Anibal pursues testing at this time and receives a negative result, this does not rule out the possibility of a hereditary cancer syndrome in her and/or her family.  Anibal reports that her sisters would probably not be interested in this type of testing, because they would not want to know this information.  She does not think that they would want to know that they could be at risk of something.    Based on her personal and family history, Anibal meets current National Comprehensive Cancer Network (NCCN) criteria for genetic testing of BRCA1 and BRCA2.  Anibal also meets criteria for Terry syndrome testing, as her PREMM5 score is 4.9%.  PREMM5 is a tool used to determine who is a good candidate for genetic  testing for Terry syndrome based on personal and family history of cancer. This model supports consideration of genetic testing for individuals who have a score > 2.5% (J Clin Oncol. 2017 May 10:[id number], PMID: 97781172).     We discussed that there are additional genes that could cause increased risk of breast and colon cancer.  As many of these genes present with overlapping features in a family, it would be reasonable for Lonandriy to consider panel genetic testing to analyze multiple genes at once.    Genetic testing is available for 34 genes associated with hereditary cancer: CancerNext (APC, DOMINIK, BARD1, BRCA1, BRCA2, BRIP1, BMPR1A, CDH1, CDK4, CDKN2A, CHEK2, DICER1, EPCAM, GREM1, HOXB13, MLH1, MRE11A, MSH2, MSH6, MUTYH, NBN, NF1, PALB2, PMS2, POLD1, POLE, PTEN, RAD50, RAD51C, RAD51D, SMAD4, SMARCA4, STK11, and TP53).    Many of the genes in the CancerNext panel have known risks and published management guidelines.  Some genes are associated with specific hereditary cancer syndromes: Hereditary Breast and Ovarian Cancer syndrome (BRCA1, BRCA2), Terry syndrome (MLH1, MSH2, MSH6, PMS2, EPCAM), Familial Adenomatous Polyposis syndrome (APC), Hereditary Gastric Cancer syndrome (CDH1), Familial Atypical Multiple Mole Melanoma syndrome (CDK4, CDKN2A), Juvenile Polyposis syndrome (BMPR1A, SMAD4), Cowden syndrome (PTEN), Li Fraumeni syndrome (TP53), Peutz-Jeghers syndrome (STK11), MUTYH Associated Polyposis syndrome (MUTYH), and Neurofibromatosis type 1 (NF1).     The DOMINIK, BRIP1, CHEK2, GREM1, NBN , PALB2, POLD1, POLE, RAD51C, and RAD51D genes are associated with increased cancer risk and have published management guidelines for certain cancers.      The remaining genes (BARD1, DICER1, HOXB13, MRE11A, RAD50, and SMARCA4) are associated with increased cancer risk and may allow us to make medical recommendations when mutations are identified.      Consent was obtained and genetic testing for CancerNext was sent to [facility]. Turn around time: 5 weeks.      A detailed handout regarding breast cancer risk genes, Terry syndrome, and the information we discussed was provided to Anibal at the end of our appointment today and can be found in the after visit summary.  Topics included: inheritance pattern, cancer risks, cancer screening recommendations, and also risks, benefits and limitations of testing.  A list of genes from the CancerNext test from [facility] was also included.     Medical Management: The information from genetic testing may determine additional cancer screening recommendations (i.e. mammogram and breast MRI, more frequent colonoscopies, annual dermatologic exams, etc.) as well as options for risk reducing surgeries (i.e. bilateral mastectomy, surgery to remove the ovaries and/or uterus, etc.) for Anibal and her relatives.  These recommendations will be discussed in detail once genetic testing is completed.    Plan:  1) Today Anibal elected to proceed with CancerNext.  2) This information should be available in 4-5 weeks.   3) Anibal will return to clinic to discuss the results. A return appointment was scheduled for 9/15 at 10:15 am.    Face to face time: 45 minutes    Betzaida Rouse MS, [facility]  Certified Genetic Counselor  P. [phone]  F. [phone]

## 2017-08-04 NOTE — PROGRESS NOTES
8/4/2017    Referring Provider: Roque Almaguer MD     Presenting Information:   I met with Anibal Storey today for genetic counseling at the Cancer [facility] Program at the [facility] to discuss her family history of colon, breast, and other cancers.  She is here today with her  to review this history, cancer screening recommendations, and available genetic testing options.    Personal History:  Anibal is a 66 year old female.  She does not have any personal history of cancer.      She had her first menstrual period at age 13, her first child at age 28, and is post-menopausal (menopause started at 49).  Anibal has her ovaries, fallopian tubes and uterus in place, and she has had no ovarian cancer screening to date.  She reports she has not used hormone replacement therapy.      She reports having annual mammograms. Her last mammogram was in 7/12/2017 and was benign, as was a left breast ultrasound due to breast pain. Anibal does report a history of a breast biopsy of a benign breast growth that was golf ball sized and cone-shaped. This was in 1985.  Anibal's most recent colonoscopy in 2017, and three 3-4 millimeter polyps were found in the ascending colon. These were tubular adenomas. She has had three other polyps, two in 2016 (sessile serrated adenomas) and one in 2006.      Anibal also had pharmacogenetic testing with one of her other providers. We discussed that this is not my area of expertise, but that some genes can cause some individuals to metabolize (or process) certain drugs better than others.  We discussed that she should discuss the implications of these results with the physician that ordered them for her.  Her other physicians should also be aware of these results, as they may use this information in their decision-making regarding prescribing certain medications.    Family History: (Please see scanned pedigree for detailed family history information)    Two sons, ages 38 and 36.  "The older son gets colonoscopies for his colitis, but Anibal does not think he has had polyps.     Sister, age 72, had breast cancer in her mid 30s and had a mastectomy.    Sister, age 76, had colon cancer at 48, uterine cancer in her 50s, and bladder cancer in her 60s.  The bladder cancer was thought to be due to radiation treatment from her other cancers.     Four sisters with no cancers.  Anibal does not know if they have had polyps. She thinks one sister had genetic testing, but her sister does not know the results. She said that her sister's , a gastroenterologist, ordered the testing, but Anibal does not know the results of this, but remembers that her sister was \"high risk\" for something on this test. We discussed the importance of obtaining a copy of this test report to help interpret Anibal's testing. She will try to get a copy, but this may not be possible.    Mother  of metastatic cancer of unknown primary in her 90s.    Maternal uncle with colon cancer in his 60s or 70s.    Father  of complications of surgery due to a colon cancer diagnosis at 61.    No other known family history of cancer.     Her maternal ethnicity is [place]. Her paternal ethnicity is [place].  There is no known Ashkenazi Yarsani ancestry on either side of her family. There is no reported consanguinity.    Discussion:    Anibal's family history of cancer is suggestive of a hereditary cancer syndrome.    We reviewed the features of sporadic, familial, and hereditary cancers.  In looking at Anibal's family history, it is possible that a cancer susceptibility gene is present due to early-onset breast cancer in her sister, her sister with three primary cancers (uterine, colon, and bladder), and family history of polyps and colon cancer.    We discussed her sister's early-onset breast cancer, and that it may have a hereditary cause. Mutations in either BRCA1 or BRCA2 cause Hereditary Breast and Ovarian Cancer syndrome (HBOC).  " Women with HBOC have an increased risk for breast and ovarian cancer.  There is also an increased risk for prostate and breast cancer among males with a BRCA1/BRCA2 mutation.  Males and females with BRCA1/BRCA2 mutations also face a slightly increased risk for pancreatic cancer.      We also discussed her sister with three primary cancers (uterine, colon, and bladder), which are all part of the Terry syndrome spectrum. Terry syndrome can be caused by a mutation in one of five genes:  MLH1, MSH2, MSH6, PMS2, and EPCAM.  Terry syndrome may present with polyps, but typically a small number.  The highest cancer risks associated with Terry syndrome include colon cancer, endometrial/uterine cancer, gastric cancer, and ovarian cancer.    We discussed that genetic testing for colon and breast cancer susceptibility genes is typically most informative when it is first performed on a family member with a personal history of these cancers.  In this situation, we discussed that Anibal's sister with three cancers and sister with early-onset breast cancer would be the best people to test first.  Testing is available to Anibal, but with limitations.  If Anibal pursues testing at this time and receives a negative result, this does not rule out the possibility of a hereditary cancer syndrome in her and/or her family.  Anibal reports that her sisters would probably not be interested in this type of testing, because they would not want to know this information.  She does not think that they would want to know that they could be at risk of something.    Based on her personal and family history, Anibal meets current National Comprehensive Cancer Network (NCCN) criteria for genetic testing of BRCA1 and BRCA2.  Anibal also meets criteria for Terry syndrome testing, as her PREMM5 score is 4.9%.  PREMM5 is a tool used to determine who is a good candidate for genetic testing for Terry syndrome based on personal and family history of cancer. This  model supports consideration of genetic testing for individuals who have a score > 2.5% (J Clin Oncol. 2017 May 10:CRY9040061974, PMID: 98570827).     We discussed that there are additional genes that could cause increased risk of breast and colon cancer.  As many of these genes present with overlapping features in a family, it would be reasonable for Anibal to consider panel genetic testing to analyze multiple genes at once.    Genetic testing is available for 34 genes associated with hereditary cancer: CancerNext (APC, DOMINIK, BARD1, BRCA1, BRCA2, BRIP1, BMPR1A, CDH1, CDK4, CDKN2A, CHEK2, DICER1, EPCAM, GREM1, HOXB13, MLH1, MRE11A, MSH2, MSH6, MUTYH, NBN, NF1, PALB2, PMS2, POLD1, POLE, PTEN, RAD50, RAD51C, RAD51D, SMAD4, SMARCA4, STK11, and TP53).    Many of the genes in the CancerNext panel have known risks and published management guidelines.  Some genes are associated with specific hereditary cancer syndromes: Hereditary Breast and Ovarian Cancer syndrome (BRCA1, BRCA2), Terry syndrome (MLH1, MSH2, MSH6, PMS2, EPCAM), Familial Adenomatous Polyposis syndrome (APC), Hereditary Gastric Cancer syndrome (CDH1), Familial Atypical Multiple Mole Melanoma syndrome (CDK4, CDKN2A), Juvenile Polyposis syndrome (BMPR1A, SMAD4), Cowden syndrome (PTEN), Li Fraumeni syndrome (TP53), Peutz-Jeghers syndrome (STK11), MUTYH Associated Polyposis syndrome (MUTYH), and Neurofibromatosis type 1 (NF1).     The DOMINIK, BRIP1, CHEK2, GREM1, NBN , PALB2, POLD1, POLE, RAD51C, and RAD51D genes are associated with increased cancer risk and have published management guidelines for certain cancers.      The remaining genes (BARD1, DICER1, HOXB13, MRE11A, RAD50, and SMARCA4) are associated with increased cancer risk and may allow us to make medical recommendations when mutations are identified.      Consent was obtained and genetic testing for CancerNext was sent to [facility]. Turn around time: 5 weeks.      A detailed handout  regarding breast cancer risk genes, Terry syndrome, and the information we discussed was provided to Anibal at the end of our appointment today and can be found in the after visit summary.  Topics included: inheritance pattern, cancer risks, cancer screening recommendations, and also risks, benefits and limitations of testing.  A list of genes from the CancerNext test from [employer] was also included.     Medical Management: The information from genetic testing may determine additional cancer screening recommendations (i.e. mammogram and breast MRI, more frequent colonoscopies, annual dermatologic exams, etc.) as well as options for risk reducing surgeries (i.e. bilateral mastectomy, surgery to remove the ovaries and/or uterus, etc.) for Anibal and her relatives.  These recommendations will be discussed in detail once genetic testing is completed.    Plan:  1) Today Anibal elected to proceed with CancerNext.  2) This information should be available in 4-5 weeks.   3) Anibal will return to clinic to discuss the results. A return appointment was scheduled for 9/15 at 10:15 am.    Face to face time: 45 minutes    Betzaida Rouse MS, [facility]  Certified Genetic Counselor  P. [phone]  F. [phone]

## 2017-08-06 LAB
5HIAA & CREATININE UR-IMP: NORMAL
5OH-INDOLEACETATE 24H UR-MCNC: 2.8 MG/ML
5OH-INDOLEACETATE 24H UR-MRATE: 5 MG/(24.H)
5OH-INDOLEACETATE/CREAT 24H UR: 6 MG/G{CREAT}
COLLECT DURATION TIME UR: 24 H
COLLECT DURATION TIME UR: 24 H
CREAT 24H UR-MRATE: 906 G/(24.H)
CREAT 24H UR-MRATE: 925 G/(24.H)
CREAT SERPL-MCNC: 49 MG/DL
CREAT SERPL-MCNC: 50 MG/DL
METANEPH 24H UR-MCNC: 37 NG/ML
METANEPH 24H UR-MRATE: 68
METANEPH+NORMETANEPH UR-IMP: NORMAL
METANEPH/CREAT 24H UR: 76
MISCELLANEOUS TEST: NORMAL
NORMETANEPHRINE 24H UR-MCNC: 91 UG/ML
NORMETANEPHRINE 24H UR-MRATE: 168
NORMETANEPHRINE/CREAT 24H UR: 186
SPECIMEN VOL ?TM UR: 1850 ML
SPECIMEN VOL ?TM UR: 1850 ML

## 2017-08-09 LAB
COLLECT DURATION TIME SPEC: 24 H
CORTIS F 24H UR HPLC-MCNC: 6.12 UG/ML
CORTIS F 24H UR-MRATE: 11.3
CORTIS F/CREAT 24H UR: 12.24
CREAT 24H UR-MCNC: 50 MG/DL
CREAT 24H UR-MRATE: 925 G/(24.H)
IMP & REVIEW OF LAB RESULTS: NORMAL
SPECIMEN VOL ?TM UR: 1850 ML

## 2017-08-10 ENCOUNTER — OFFICE VISIT (OUTPATIENT)
Dept: OPHTHALMOLOGY | Facility: CLINIC | Age: 67
End: 2017-08-10
Attending: OPHTHALMOLOGY
Payer: MEDICARE

## 2017-08-10 DIAGNOSIS — H25.13 AGE-RELATED NUCLEAR CATARACT OF BOTH EYES: ICD-10-CM

## 2017-08-10 DIAGNOSIS — H52.13 MYOPIA WITH ASTIGMATISM, BILATERAL: ICD-10-CM

## 2017-08-10 DIAGNOSIS — H04.123 DRY EYE SYNDROME, BILATERAL: ICD-10-CM

## 2017-08-10 DIAGNOSIS — H35.363 MACULAR DRUSEN, BILATERAL: ICD-10-CM

## 2017-08-10 DIAGNOSIS — H52.203 MYOPIA WITH ASTIGMATISM, BILATERAL: ICD-10-CM

## 2017-08-10 DIAGNOSIS — H02.889 MEIBOMIAN GLAND DYSFUNCTION: Primary | ICD-10-CM

## 2017-08-10 PROCEDURE — 99214 OFFICE O/P EST MOD 30 MIN: CPT | Mod: ZF

## 2017-08-10 ASSESSMENT — REFRACTION_WEARINGRX
OD_SPHERE: -2.75
SPECS_TYPE: SVL
OD_CYLINDER: SPHERE
OS_CYLINDER: +0.75
OS_SPHERE: -1.50
OS_AXIS: 170

## 2017-08-10 ASSESSMENT — VISUAL ACUITY
OD_CC: 20/20
OS_CC: 20/20
OS_CC+: -1
METHOD: SNELLEN - LINEAR
METHOD_MR: PT DEFERS TODAY
CORRECTION_TYPE: GLASSES
OD_CC+: -1

## 2017-08-10 ASSESSMENT — CUP TO DISC RATIO
OS_RATIO: 0.4
OD_RATIO: 0.4

## 2017-08-10 ASSESSMENT — SLIT LAMP EXAM - LIDS
COMMENTS: MGD, MILD BLEPHARITIS
COMMENTS: MGD, MILD BLEPHARITIS

## 2017-08-10 ASSESSMENT — EXTERNAL EXAM - RIGHT EYE: OD_EXAM: NORMAL

## 2017-08-10 ASSESSMENT — TONOMETRY
OD_IOP_MMHG: 16
OS_IOP_MMHG: 14
IOP_METHOD: TONOPEN

## 2017-08-10 ASSESSMENT — CONF VISUAL FIELD
OS_NORMAL: 1
OD_NORMAL: 1

## 2017-08-10 ASSESSMENT — EXTERNAL EXAM - LEFT EYE: OS_EXAM: NORMAL

## 2017-08-10 NOTE — NURSING NOTE
Chief Complaints and History of Present Illnesses   Patient presents with     Follow Up For     yearly f/u dry eye, mild cataracts     HPI    Affected eye(s):  Both   Symptoms:     No decreased vision   Floaters (Comment: only rarely noted, stable/maybe slightly more since last year)   No flashes   Foreign body sensation   Dryness      Duration:  1 year      Do you have eye pain now?:  No      Comments:  Pt here for annual check  Pt states only a slight change in vision noted - nothing significant    Warm compresses occasionally used  Artificial tears 2x/day OU    Ophelia WARD 9:19 AM August 10, 2017

## 2017-08-10 NOTE — PROGRESS NOTES
HPI: Anibal Storey is a 66 year old female who presents for annual eye exam. Reports small decrease in vision in past year, one new floater on the right field of vision, right eye. Using ATs BID with infrequent warm compress. Denies pain, irritation, itchiness, epiphora, new floaters or flashing lights.     POHx:  -repeat upper lid blepharoplasty OU 4/2016   -Macular drusen OU  -Ocular rosacea     Current Eye Medications:   -AT daily    Assessment & Plan   Anibal Storey is a 66 year old female with the following diagnoses:   1. Meibomian gland dysfunction - Both Eyes    2. Age-related nuclear cataract of both eyes    3. Macular drusen, bilateral    4. Dry eye syndrome, bilateral    5. Myopia with astigmatism, bilateral        Doing well overall, no changes in vision   Stable DFE with great BCVA   Patient deferred glasses prescription today  Visually insignificant cataracts and mac drusen, continue to monitor     Reports adequate control of dry eyes  Add warm compresses 1-2x daily   Increase artificial tears 2-4 x daily as needed     Disposition: Recheck with dilated fundus exam in 1 year    Chema Lyons MD  Ophthalmology Resident, PGY-2  [facility]    Attending Physician Attestation:  Complete documentation of historical and exam elements from today's encounter can be found in the full encounter summary report (not reduplicated in this progress note).  I personally obtained the chief complaint(s) and history of present illness.  I confirmed and edited as necessary the review of systems, past medical/surgical history, family history, social history, and examination findings as documented by others; and I examined the patient myself.  I personally reviewed the relevant tests, images, and reports as documented above.  I formulated and edited as necessary the assessment and plan and discussed the findings and management plan with the patient and family. . - Morgan Munguia MD

## 2017-08-10 NOTE — MR AVS SNAPSHOT
After Visit Summary   8/10/2017    Anibal Storey    MRN: [id number]           Patient Information     Date Of Birth          1950        Visit Information        Provider Department      8/10/2017 9:15 AM Morgan Munguia MD [facility]        Today's Diagnoses     Meibomian gland dysfunction - Both Eyes    -  1    Age-related nuclear cataract of both eyes        Macular drusen, bilateral        Dry eye syndrome, bilateral        Myopia with astigmatism, bilateral           Follow-ups after your visit        Follow-up notes from your care team     Return in about 1 year (around 8/10/2018) for Annual Visit.      Your next 10 appointments already scheduled     Sep 15, 2017 10:15 AM CDT   (Arrive by 10:00 AM)   Return Visit with Betzaida Rouse [facility])    [address] [place] [id number] [phone]              Who to contact     Please call your clinic at [phone] to:    Ask questions about your health    Make or cancel appointments    Discuss your medicines    Learn about your test results    Speak to your doctor   If you have compliments or concerns about an experience at your clinic, or if you wish to file a complaint, please contact [facility] Patient Relations at [phone] or email us at [email or web address]         Additional Information About Your Visit        MyChart Information     [employer] gives you secure access to your electronic health record. If you see a primary care provider, you can also send messages to your care team and make appointments. If you have questions, please call your primary care clinic.  If you do not have a primary care provider, please call [phone] and they will assist you.      [employer] is an electronic gateway that provides easy, online access to your medical records. With [employer], you can request a clinic appointment, read  your test results, renew a prescription or communicate with your care team.     To access your existing account, please contact your [facility] or call [phone] for assistance.        Care EveryWhere ID     This is your Care EveryWhere ID. This could be used by other organizations to access your [facility] medical records  [id number]         Blood Pressure from Last 3 Encounters:   07/17/17 151/79   07/07/17 131/81   06/29/17 123/81    Weight from Last 3 Encounters:   07/07/17 61 kg (134 lb 6.4 oz)   06/29/17 60.4 kg (133 lb 3.2 oz)   06/28/17 59.9 kg (132 lb)              Today, you had the following     No orders found for display       Primary Care Provider Office Phone # Fax #    Roque Almaguer [phone]       [address] [address]        Equal Access to Services     COURTNEY FRANCIS : Hadii aad ku hadasho Soradhamesali, waaxda luqadaha, qaybta kaalmada adeegyada, waxay feliciain hayedgardon kenzie baker . So [facility] [phone].    ATENCIÓN: Si habla español, tiene a mata disposición servicios gratuitos de asistencia lingüística. Llame al [phone].    We comply with applicable federal civil rights laws and [place] laws. We do not discriminate on the basis of race, color, national origin, age, disability sex, sexual orientation or gender identity.            Thank you!     Thank you for choosing [facility]  for your care. Our goal is always to provide you with excellent care. Hearing back from our patients is one way we can continue to improve our services. Please take a few minutes to complete the written survey that you may receive in the mail after your visit with us. Thank you!             Your Updated Medication List - Protect others around you: Learn how to safely use, store and throw away your medicines at www.disposemymeds.org.          This list is accurate as of: 8/10/17 10:48 AM.  Always use your most recent med list.                    Brand Name Dispense Instructions for use Diagnosis    ALEVE PO      Take 220 mg by mouth daily as needed for moderate pain        aspirin 81 MG EC tablet     90 tablet    Take 1 tablet (81 mg) by mouth daily    Chest pain at rest, Hyperlipidemia LDL goal <100, Coronary artery disease involving native coronary artery of native heart without angina pectoris       calcium carbonate 500 MG chewable tablet    TUMS     Take 1 chew tab by mouth as needed.        clobetasol 0.05 % ointment    TEMOVATE    15 g    Use on itchy areas on hands and feet for up to 2 weeks    Dyshidrotic eczema       CRESTOR 5 MG tablet   Generic drug:  rosuvastatin     90 tablet    Take 1 tablet (5 mg) by mouth daily    Hyperlipidemia LDL goal <100, Chest pain at rest, Coronary artery disease involving native coronary artery of native heart without angina pectoris       metroNIDAZOLE 0.75 % cream    METROCREAM    45 g    Apply topically 2 times daily To affected areas on the face    Rosacea       NATURAL BALANCE OP      Apply to eye daily Preservative free both eyes.        nitroGLYcerin 0.4 MG sublingual tablet    NITROSTAT    30 tablet    Place 1 tablet (0.4 mg) under the tongue every 5 minutes as needed for chest pain    Chest pain at rest, Hyperlipidemia LDL goal <100, Coronary artery disease involving native coronary artery of native heart without angina pectoris       TYLENOL PO      Take 500 mg by mouth 2 times daily as needed.        VITAMIN C PO      Take 500 mg by mouth as needed. Pt states she takes this when she gets a cold        VITAMIN D (CHOLECALCIFEROL) PO      Take 1,000 Units by mouth daily Every other day

## 2017-08-29 ENCOUNTER — TRANSFERRED RECORDS (OUTPATIENT)
Dept: HEALTH INFORMATION MANAGEMENT | Facility: CLINIC | Age: 67
End: 2017-08-29

## 2017-09-26 ENCOUNTER — TELEPHONE (OUTPATIENT)
Dept: ONCOLOGY | Facility: CLINIC | Age: 67
End: 2017-09-26

## 2017-09-26 NOTE — TELEPHONE ENCOUNTER
"9/26/2017    Referring Provider: Roque Almaguer MD     Presenting Information:  I spoke to Anibal by phone today to discuss her genetic testing results, as she was unable to make her appointment on 9/15. Her blood was drawn on 8/4/2017. The CancerNext test was ordered  from [facility]. This testing was done because of Anibal's family history of cancer.    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  No other mutations were found in the NF1 gene.      Of note, Anibal tested negative for mutations in the following genes: APC, DOMINIK, BARD1, BRCA1, BRCA2, BRIP1, BMPR1A, CDH1, CDK4, CDKN2A, CHEK2, DICER1, EPCAM, HOXB13, GREM1, MLH1, MRE11A, MSH2, MSH6, MUTYH, NBN, NF1, PALB2, PMS2, POLD1, POLE, PTEN, RAD50, RAD51C, RAD51D, SMAD4, SMARCA4, STK11, and TP53 genes.  This test involved sequencing and deletion/duplication analysis of all genes with the exception of EPCAM and GREM1 (deletions only).  Anibal cannot pass on a harmful mutation in any of these genes to her children based on this test result.  Mutations in these genes do not skip generations.      A copy of the test report can be found in the Laboratory tab, dated 8/4/2017, and named \"SEND OUTS MISC TEST\". The report is scanned in as a linked document.    Interpretation:  We discussed different interpretations of this inconclusive test result.  It is not clear if this variant in the NF1 gene is associated with increased cancer risk.  1. This variant may be a benign change that does not cause neurofibromatosis type 1, and does not increase cancer risk.  2. This variant may be a harmful mutation that causes neurofibromatosis type 1.     We discussed that neurofibromatosis type, caused by harmful mutations in the NF1 gene, is characterized by skin pigmentation differences (cafe-au-lait spots) and tumors that grow along " the nerves in the skin and other areas of the body.  Signs and symptoms of the condition are variable. Women with neurofibromatosis type 1 have an increased risk for breast cancer.     We discussed that if Anibal wanted to address for certain if she had neurofibromatosis (although this is unlikely), she could be seen in the neurofibromatosis clinic at [facility] by Dr. Remy Hidalgo. She felt like this was something that she might want to pursue, as she has a few brown spots on her skin. Appointments can be made by calling [phone].     The lab is working to determine if this variant is harmful or benign, and they will contact me if it is reclassified.  If this variant is determined to be a benign change, there may be a different gene or combination of genes and environment that are associated with the cancers in Anibal s family.      It is also important to consider that her sisters who have had cancer may have had a mutation in one of the genes tested and she did not inherit it.  Her sisters have not been interested in testing.  We did discuss that her sisters' children could pursue testing. Two of her sister's children are medical doctors, and Anibal thinks they may have done this, but have not shared results. She has another sister (who has not had cancer) who had reportedly positive genetic testing. Her sister does not know what this testing was or where it was done. Her  is a gastroenterologist who directs her screening. We discussed the importance of finding this information out, as it would impact how we interpret Anibal's test results and potentially other family member's results.     Inheritance:  Anibal has a 50% chance to pass this variant to each of her children.   Likewise, each of her siblings also has a 50% risk of having the same variant.  Because it is unclear what, if any, risk is associated with this NF1 variant, clinical genetic testing is not recommended for  relatives.    Screening:  Based on this inconclusive test result, it is important for Anibal and her relatives to refer back to the family history for appropriate cancer screening.      Anibal and close female relatives of her sister who had breast cancer remain at increased risk for breast cancer given their family history.  Breast cancer screening is generally recommended to begin approximately 10 years younger than the earliest age of breast cancer diagnosis in the family, or at age 40, whichever comes first. Breast screening options should be discussed with an individual's primary care provider and a genetic counselor, to determine what screening is appropriate, or if additional screening (such as breast MRI) is necessary based on personal/family history factors.  Anibal's lifetime risk for breast cancer based on the ANDREW model is 9.3%, indicating that she should follow general population breast screening, as directed by her physician.    Per National Comprehensive Cancer Network (NCCN) guidelines, individuals with a first degree relative with colorectal cancer diagnosed at any age should begin colonoscopy at age 40, or 10 years before the earliest diagnosis of colorectal cancer, whichever is first.  Colonoscopy should be repeated every 5-10 years, or individualized per colonoscopy findings.  Anibal is already having colonoscopies every three years because of personal and family history. She should follow the recommendations of her physicians for colon screening.      Due to Anibal's family history of uterine cancer, she and other close female relatives of her sister who had uterine cancer remain at slightly increased risk for uterine cancer.  There are significant limitations to uterine cancer screening. As such, this screening is not typically recommended.  That being said, women in this family should discuss screening and the signs and symptoms of uterine cancer with their primary OB/GYN provider, as they may have  individualized recommendations.    Other population cancer screening, such as recommendations by the American Cancer Society, are also appropriate for Anibal and her family.  These screening recommendations may change if there are changes to Anibal's personal and/or family history.      Final screening recommendations should be made by each individual's managing physician.    Summary:  We do not have an explanation for her family history of cancer.  Because of that, it is important that she continue with cancer screening based on her personal and family history as discussed above.    Genetic testing is rapidly advancing, and new cancer susceptibility genes will most likely be identified in the future.  Therefore, I encourage Anibal to contact me annually or if there are changes in her personal or family history.  This may change how we assess her cancer risk, screening, and the testing we would offer.    Plan:  1.  I will mail Anibal a copy of her test results.    2. She should plan to follow-up with her physicians.  3. She should contact me annually, or sooner if family history changes.  4. I will contact Anibal if the lab informs me that this VUS has been reclassified.  This may change screening and testing recommendations for relatives.  5. Anibal should contact me if she discovers any information about her sister's genetic test results, as it may impact interpretation of Anibal's results.     If Anibal has any further questions, I encouraged her to contact me at [phone].    Betzaida Rouse MS, [facility] [place]  Certified Genetic Counselor  P. [phone]  F. [phone]

## 2017-09-26 NOTE — Clinical Note
Please print and send a copy of this letter to the patient.    Please enclose test report: Send outs misc test [[id number]] (Order [id number])  - SCANNED DOCUMENT

## 2017-09-26 NOTE — LETTER
Cancer Risk Management  Program [facility]  [facility]  [facility]  [facility] [place] [facility]  Mailing Address  Cancer Risk Management Program  [facility]  [address]  [place] [address]    New patient appointments  [phone]  September 26, 2017    Anibal Araujo[address] [place] [address]      Dear Anibal,    It was a pleasure speaking with you on the phone on 9/26/2017.  Here is a copy of the progress note from our discussion.  If you have any additional questions, please feel free to call.    Referring Provider: Roque Almaguer MD     Presenting Information:  I spoke to Anibal by phone today to discuss her genetic testing results, as she was unable to make her appointment on 9/15. Her blood was drawn on 8/4/2017. The CancerNext test was ordered  from [facility]. This testing was done because of Anibal's family history of cancer.    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  This variant is called c.6253G>A (p.[id number]).  No other mutations were found in the NF1 gene.      Of note, Anibal tested negative for mutations in the following genes: APC, DOMINIK, BARD1, BRCA1, BRCA2, BRIP1, BMPR1A, CDH1, CDK4, CDKN2A, CHEK2, DICER1, EPCAM, HOXB13, GREM1, MLH1, MRE11A, MSH2, MSH6, MUTYH, NBN, NF1, PALB2, PMS2, POLD1, POLE, PTEN, RAD50, RAD51C, RAD51D, SMAD4, SMARCA4, STK11, and TP53 genes.  This test involved sequencing and deletion/duplication analysis of all genes with the exception of EPCAM and GREM1 (deletions only).  Anibal cannot pass on a harmful mutation in any of these genes to her children based on this test result.  Mutations in these genes do not skip generations.            Interpretation:  We discussed different  interpretations of this inconclusive test result.  It is not clear if this variant in the NF1 gene is associated with increased cancer risk.  1. This variant may be a benign change that does not cause neurofibromatosis type 1, and does not increase cancer risk.  2. This variant may be a harmful mutation that causes neurofibromatosis type 1.     We discussed that neurofibromatosis type, caused by harmful mutations in the NF1 gene, is characterized by skin pigmentation differences (cafe-au-lait spots) and tumors that grow along the nerves in the skin and other areas of the body.  Signs and symptoms of the condition are variable. Women with neurofibromatosis type 1 have an increased risk for breast cancer.     We discussed that if Anibal wanted to address for certain if she had neurofibromatosis (although this is unlikely), she could be seen in the neurofibromatosis clinic at [facility] by Dr. Remy Hidalgo. She felt like this was something that she might want to pursue, as she has a few brown spots on her skin. Appointments can be made by calling [phone].     The lab is working to determine if this variant is harmful or benign, and they will contact me if it is reclassified.  If this variant is determined to be a benign change, there may be a different gene or combination of genes and environment that are associated with the cancers in Anibal s family.      It is also important to consider that her sisters who have had cancer may have had a mutation in one of the genes tested and she did not inherit it.  Her sisters have not been interested in testing.  We did discuss that her sisters' children could pursue testing. Two of her sister's children are medical doctors, and Anibal thinks they may have done this, but have not shared results. She has another sister (who has not had cancer) who had reportedly positive genetic testing. Her sister does not know what this testing was or where it was done. Her  is a  gastroenterologist who directs her screening. We discussed the importance of finding this information out, as it would impact how we interpret Anibal's test results and potentially other family member's results.     Inheritance:  Anibal has a 50% chance to pass this variant to each of her children.   Likewise, each of her siblings also has a 50% risk of having the same variant.  Because it is unclear what, if any, risk is associated with this NF1 variant, clinical genetic testing is not recommended for relatives.    Screening:  Based on this inconclusive test result, it is important for Anibal and her relatives to refer back to the family history for appropriate cancer screening.      Anibal and close female relatives of her sister who had breast cancer remain at increased risk for breast cancer given their family history.  Breast cancer screening is generally recommended to begin approximately 10 years younger than the earliest age of breast cancer diagnosis in the family, or at age 40, whichever comes first. Breast screening options should be discussed with an individual's primary care provider and a genetic counselor, to determine what screening is appropriate, or if additional screening (such as breast MRI) is necessary based on personal/family history factors.  Anibal's lifetime risk for breast cancer based on the ANDREW model is 9.3%, indicating that she should follow general population breast screening, as directed by her physician.    Per National Comprehensive Cancer Network (NCCN) guidelines, individuals with a first degree relative with colorectal cancer diagnosed at any age should begin colonoscopy at age 40, or 10 years before the earliest diagnosis of colorectal cancer, whichever is first.  Colonoscopy should be repeated every 5-10 years, or individualized per colonoscopy findings.  Anibal is already having colonoscopies every three years because of personal and family history. She should follow the  recommendations of her physicians for colon screening.      Due to Anibal's family history of uterine cancer, she and other close female relatives of her sister who had uterine cancer remain at slightly increased risk for uterine cancer.  There are significant limitations to uterine cancer screening. As such, this screening is not typically recommended.  That being said, women in this family should discuss screening and the signs and symptoms of uterine cancer with their primary OB/GYN provider, as they may have individualized recommendations.    Other population cancer screening, such as recommendations by the American Cancer Society, are also appropriate for Anibal and her family.  These screening recommendations may change if there are changes to Anibal's personal and/or family history.      Final screening recommendations should be made by each individual's managing physician.    Summary:  We do not have an explanation for her family history of cancer.  Because of that, it is important that she continue with cancer screening based on her personal and family history as discussed above.    Genetic testing is rapidly advancing, and new cancer susceptibility genes will most likely be identified in the future.  Therefore, I encourage Anibal to contact me annually or if there are changes in her personal or family history.  This may change how we assess her cancer risk, screening, and the testing we would offer.    Plan:  1.  I will mail Anibal a copy of her test results.    2. She should plan to follow-up with her physicians.  3. She should contact me annually, or sooner if family history changes.  4. I will contact Anibal if the lab informs me that this VUS has been reclassified.  This may change screening and testing recommendations for relatives.  5. Anibal should contact me if she discovers any information about her sister's genetic test results, as it may impact interpretation of Anibal's results.     If Anibal has any  further questions, I encouraged her to contact me at [phone].    Betzaida Rouse MS, [facility] [place]  Certified Genetic Counselor  P. [phone]  F. [phone]

## 2018-09-16 ENCOUNTER — HEALTH MAINTENANCE LETTER (OUTPATIENT)
Age: 68
End: 2018-09-16

## 2019-10-04 ENCOUNTER — HEALTH MAINTENANCE LETTER (OUTPATIENT)
Age: 69
End: 2019-10-04

## 2020-02-08 ENCOUNTER — HEALTH MAINTENANCE LETTER (OUTPATIENT)
Age: 70
End: 2020-02-08

## 2020-11-08 ENCOUNTER — HEALTH MAINTENANCE LETTER (OUTPATIENT)
Age: 70
End: 2020-11-08

## 2021-03-28 ENCOUNTER — HEALTH MAINTENANCE LETTER (OUTPATIENT)
Age: 71
End: 2021-03-28

## 2021-09-11 ENCOUNTER — HEALTH MAINTENANCE LETTER (OUTPATIENT)
Age: 71
End: 2021-09-11

## 2022-01-01 ENCOUNTER — HEALTH MAINTENANCE LETTER (OUTPATIENT)
Age: 72
End: 2022-01-01

## 2022-04-23 ENCOUNTER — HEALTH MAINTENANCE LETTER (OUTPATIENT)
Age: 72
End: 2022-04-23

## 2022-10-30 ENCOUNTER — HEALTH MAINTENANCE LETTER (OUTPATIENT)
Age: 72
End: 2022-10-30

## 2023-06-01 ENCOUNTER — HEALTH MAINTENANCE LETTER (OUTPATIENT)
Age: 73
End: 2023-06-01

## 2024-07-15 LAB — LAB SCANNED RESULT: NORMAL

## 2024-07-29 ENCOUNTER — DOCUMENTATION ONLY (OUTPATIENT)
Dept: ONCOLOGY | Facility: CLINIC | Age: 74
End: 2024-07-29

## 2024-07-29 NOTE — PROGRESS NOTES
"7/29/2024    Referring Provider: Roque Almaguer MD      Presenting Information:  Anibal Storey was previously seen as part of the Cancer Risk Management Program due to her family history of cancer. Her blood was drawn on 8/4/2017 and the CancerNext test was ordered from [facility]. At that time, Anibal was found to have a variant of unknown significance (VUS) in the NF1 gene. This variant is called  c.6253G>A (p.[id number]).      No clinically significant variants were detected at that time in the following genes tested: APC, DOMINIK, BARD1, BRCA1, BRCA2, BRIP1, BMPR1A, CDH1, CDK4, CDKN2A, CHEK2, DICER1, EPCAM, HOXB13, GREM1, MLH1, MRE11A, MSH2, MSH6, MUTYH, NBN, PALB2, PMS2, POLD1, POLE, PTEN, RAD50, RAD51C, RAD51D, SMAD4, SMARCA4, STK11, and TP53.     Result Reclassification:  Recently [facility] contacted me with a new report. The VUS in the NF1 gene has been reclassified as likely benign. This means that the NF1 variant found in Anibal is most likely NOT associated with Neurofibromatosis type 1  syndrome and an increased risk for breast cancer.       A copy of this updated genetic test report can be found in the Laboratory tab called \"SEND OUT MISC TEST\"  on 8/4/2017.     Screening Recommendations:  Anibal should continue with cancer screening based on personal medical and family history, as previously discussed.     Summary:  We do not have an explanation for Anibal's family history of cancer. While no genetic changes were identified, Anibal may still be at risk for certain cancers due to family history, environmental factors, or other genetic causes not identified by this test. Because of that, it is important that she continue with cancer screening based on her personal and family history as previously discussed.     Genetic testing is rapidly advancing, and new cancer susceptibility genes will most likely be identified in the future. Therefore, I encourage Anibal to contact me regularly OR if there are changes in her " personal or family history. This may change how we assess her cancer risk, screening, and the testing we would offer.     Plan:   1) I will send Anibal a copy of the new test report that reflects the change in classification.  2) If there are any further questions or concerns, she should not hesitate to contact me at [phone].     Jade Noel MS, [facility] [place]  Licensed, Certified Genetic Counselor  Phone: [phone]

## (undated) RX ORDER — DIPHENHYDRAMINE HYDROCHLORIDE 50 MG/ML
INJECTION INTRAMUSCULAR; INTRAVENOUS
Status: DISPENSED
Start: 2017-07-17

## (undated) RX ORDER — FENTANYL CITRATE 50 UG/ML
INJECTION, SOLUTION INTRAMUSCULAR; INTRAVENOUS
Status: DISPENSED
Start: 2017-07-17

## (undated) RX ORDER — SIMETHICONE 40MG/0.6ML
SUSPENSION, DROPS(FINAL DOSAGE FORM)(ML) ORAL
Status: DISPENSED
Start: 2017-07-17